# Patient Record
Sex: FEMALE | Race: WHITE | NOT HISPANIC OR LATINO | Employment: FULL TIME | URBAN - METROPOLITAN AREA
[De-identification: names, ages, dates, MRNs, and addresses within clinical notes are randomized per-mention and may not be internally consistent; named-entity substitution may affect disease eponyms.]

---

## 2017-04-20 ENCOUNTER — OFFICE VISIT - HEALTHEAST (OUTPATIENT)
Dept: FAMILY MEDICINE | Facility: CLINIC | Age: 52
End: 2017-04-20

## 2017-04-20 DIAGNOSIS — K52.839 MICROSCOPIC COLITIS: ICD-10-CM

## 2017-04-20 DIAGNOSIS — G47.00 INSOMNIA: ICD-10-CM

## 2017-04-20 DIAGNOSIS — H81.10 BPPV (BENIGN PAROXYSMAL POSITIONAL VERTIGO): ICD-10-CM

## 2017-04-20 DIAGNOSIS — R00.2 PALPITATIONS: ICD-10-CM

## 2017-04-20 DIAGNOSIS — I73.00 RAYNAUD'S DISEASE: ICD-10-CM

## 2017-04-20 DIAGNOSIS — Z85.3 HX: BREAST CANCER: ICD-10-CM

## 2017-04-20 DIAGNOSIS — G93.32 CHRONIC FATIGUE SYNDROME: ICD-10-CM

## 2017-04-20 LAB
ATRIAL RATE - MUSE: 69 BPM
DIASTOLIC BLOOD PRESSURE - MUSE: NORMAL MMHG
INTERPRETATION ECG - MUSE: NORMAL
P AXIS - MUSE: 47 DEGREES
PR INTERVAL - MUSE: 164 MS
QRS DURATION - MUSE: 86 MS
QT - MUSE: 424 MS
QTC - MUSE: 454 MS
R AXIS - MUSE: 20 DEGREES
SYSTOLIC BLOOD PRESSURE - MUSE: NORMAL MMHG
T AXIS - MUSE: 43 DEGREES
VENTRICULAR RATE- MUSE: 69 BPM

## 2017-04-20 RX ORDER — TAMOXIFEN CITRATE 20 MG/1
20 TABLET ORAL 2 TIMES DAILY
Status: SHIPPED | COMMUNITY
Start: 2017-04-20 | End: 2023-03-29

## 2017-04-20 ASSESSMENT — MIFFLIN-ST. JEOR: SCORE: 1254.52

## 2017-04-20 NOTE — ASSESSMENT & PLAN NOTE
Has had it for many years and now sx x 2 weeks.   Referral to pt/ ot.   Dizziness - Vestibular Therapist  Leandro Segura, PT, DPT, NCS  Physical Therapist [facility]   Appointements: [phone]  [address] [facility] [address]

## 2017-04-20 NOTE — ASSESSMENT & PLAN NOTE
Had cbc, tsh and other labs done at rheum 6 months ago and all were normal. Declined further labs today. Will order ekg today and event monitor to see if there is something worrisome noted.

## 2017-04-20 NOTE — ASSESSMENT & PLAN NOTE
No sx now.  Takes mesalamine 800mg 3 tabs bid. Now insurance is no longer covering Mesalamine. She wants to see GI to see what alternatives she has.  Referral to GI placed

## 2017-04-20 NOTE — ASSESSMENT & PLAN NOTE
Daily exercise helps. Does not want to use antidepressants. Saw rheumatologist 6 months ago who did work up that was all negative.

## 2017-05-08 ENCOUNTER — HOSPITAL ENCOUNTER (OUTPATIENT)
Dept: PHYSICAL THERAPY | Age: 52
Setting detail: THERAPIES SERIES
Discharge: STILL ADMITTED OR ADMINISTRATIVE | End: 2017-05-08
Attending: PHYSICAL THERAPIST

## 2017-05-08 DIAGNOSIS — H81.12 BPPV (BENIGN PAROXYSMAL POSITIONAL VERTIGO), LEFT: ICD-10-CM

## 2017-05-08 DIAGNOSIS — R42 DIZZINESS: ICD-10-CM

## 2017-05-10 ENCOUNTER — HOSPITAL ENCOUNTER (OUTPATIENT)
Dept: CARDIOLOGY | Facility: CLINIC | Age: 52
Discharge: HOME | End: 2017-05-10
Attending: FAMILY MEDICINE

## 2017-05-10 DIAGNOSIS — R00.2 PALPITATIONS: ICD-10-CM

## 2017-05-12 ENCOUNTER — HOSPITAL ENCOUNTER (OUTPATIENT)
Dept: PHYSICAL THERAPY | Age: 52
Setting detail: THERAPIES SERIES
Discharge: STILL ADMITTED OR ADMINISTRATIVE | End: 2017-05-12
Attending: PHYSICAL THERAPIST

## 2017-05-12 DIAGNOSIS — R42 DIZZINESS: ICD-10-CM

## 2017-05-12 DIAGNOSIS — H81.12 BPPV (BENIGN PAROXYSMAL POSITIONAL VERTIGO), LEFT: ICD-10-CM

## 2017-05-19 ENCOUNTER — COMMUNICATION - HEALTHEAST (OUTPATIENT)
Dept: FAMILY MEDICINE | Facility: CLINIC | Age: 52
End: 2017-05-19

## 2017-05-22 ENCOUNTER — HOSPITAL ENCOUNTER (OUTPATIENT)
Dept: PHYSICAL THERAPY | Age: 52
Setting detail: THERAPIES SERIES
Discharge: STILL ADMITTED OR ADMINISTRATIVE | End: 2017-05-22
Attending: PHYSICAL THERAPIST

## 2017-05-22 DIAGNOSIS — R42 DIZZINESS: ICD-10-CM

## 2017-05-22 DIAGNOSIS — H81.12 BPPV (BENIGN PAROXYSMAL POSITIONAL VERTIGO), LEFT: ICD-10-CM

## 2017-05-24 ENCOUNTER — COMMUNICATION - HEALTHEAST (OUTPATIENT)
Dept: FAMILY MEDICINE | Facility: CLINIC | Age: 52
End: 2017-05-24

## 2017-05-26 ENCOUNTER — HOSPITAL ENCOUNTER (OUTPATIENT)
Dept: PHYSICAL THERAPY | Age: 52
Setting detail: THERAPIES SERIES
Discharge: STILL ADMITTED OR ADMINISTRATIVE | End: 2017-05-26
Attending: PHYSICAL THERAPIST

## 2017-05-26 DIAGNOSIS — H81.12 BPPV (BENIGN PAROXYSMAL POSITIONAL VERTIGO), LEFT: ICD-10-CM

## 2017-05-26 DIAGNOSIS — R42 DIZZINESS: ICD-10-CM

## 2017-06-01 ENCOUNTER — HOSPITAL ENCOUNTER (OUTPATIENT)
Dept: PHYSICAL THERAPY | Age: 52
Setting detail: THERAPIES SERIES
Discharge: STILL ADMITTED OR ADMINISTRATIVE | End: 2017-06-01
Attending: PHYSICAL THERAPIST

## 2017-06-01 DIAGNOSIS — H81.12 BPPV (BENIGN PAROXYSMAL POSITIONAL VERTIGO), LEFT: ICD-10-CM

## 2017-06-01 DIAGNOSIS — R42 DIZZINESS: ICD-10-CM

## 2017-10-12 ENCOUNTER — OFFICE VISIT - HEALTHEAST (OUTPATIENT)
Dept: FAMILY MEDICINE | Facility: CLINIC | Age: 52
End: 2017-10-12

## 2017-10-12 DIAGNOSIS — R07.0 THROAT PAIN: ICD-10-CM

## 2017-10-12 ASSESSMENT — MIFFLIN-ST. JEOR: SCORE: 1271.31

## 2017-10-12 NOTE — ASSESSMENT & PLAN NOTE
She has anterior neck pain on and off for several years. Started to get gradually worse about 6 months ago and now in the past one month she it is significantly worse. The pain feels like pinching feeling that radiates outward from her anterior neck just below the thyroid.   She feels her voice is weak and hoarse at times.    No problem swallowing.

## 2017-11-08 ENCOUNTER — OFFICE VISIT - HEALTHEAST (OUTPATIENT)
Dept: OTOLARYNGOLOGY | Facility: CLINIC | Age: 52
End: 2017-11-08

## 2017-11-08 DIAGNOSIS — R07.0 THROAT PAIN: ICD-10-CM

## 2017-11-28 ENCOUNTER — HOSPITAL ENCOUNTER (OUTPATIENT)
Dept: CT IMAGING | Facility: CLINIC | Age: 52
Discharge: HOME | End: 2017-11-28
Attending: OTOLARYNGOLOGY

## 2017-11-28 DIAGNOSIS — R07.0 THROAT PAIN: ICD-10-CM

## 2017-12-07 ENCOUNTER — COMMUNICATION - HEALTHEAST (OUTPATIENT)
Dept: OTOLARYNGOLOGY | Facility: CLINIC | Age: 52
End: 2017-12-07

## 2018-03-01 ENCOUNTER — OFFICE VISIT - RIVER FALLS (OUTPATIENT)
Dept: FAMILY MEDICINE | Facility: CLINIC | Age: 53
End: 2018-03-01

## 2018-03-01 ASSESSMENT — MIFFLIN-ST. JEOR: SCORE: 1287.63

## 2018-04-24 ENCOUNTER — OFFICE VISIT - HEALTHEAST (OUTPATIENT)
Dept: FAMILY MEDICINE | Facility: CLINIC | Age: 53
End: 2018-04-24

## 2018-04-24 DIAGNOSIS — H81.10 BPPV (BENIGN PAROXYSMAL POSITIONAL VERTIGO): ICD-10-CM

## 2018-04-24 DIAGNOSIS — G47.00 INSOMNIA: ICD-10-CM

## 2018-04-24 DIAGNOSIS — Z13.29 SCREENING FOR ENDOCRINE, NUTRITIONAL, METABOLIC AND IMMUNITY DISORDER: ICD-10-CM

## 2018-04-24 DIAGNOSIS — Z13.0 SCREENING, ANEMIA, DEFICIENCY, IRON: ICD-10-CM

## 2018-04-24 DIAGNOSIS — Z85.3 HX: BREAST CANCER: ICD-10-CM

## 2018-04-24 DIAGNOSIS — R07.0 THROAT PAIN: ICD-10-CM

## 2018-04-24 DIAGNOSIS — Z12.11 SCREEN FOR COLON CANCER: ICD-10-CM

## 2018-04-24 DIAGNOSIS — Z13.1 SCREENING FOR DIABETES MELLITUS: ICD-10-CM

## 2018-04-24 DIAGNOSIS — K52.839 MICROSCOPIC COLITIS: ICD-10-CM

## 2018-04-24 DIAGNOSIS — Z13.29 SCREENING FOR THYROID DISORDER: ICD-10-CM

## 2018-04-24 DIAGNOSIS — Z13.228 SCREENING FOR ENDOCRINE, NUTRITIONAL, METABOLIC AND IMMUNITY DISORDER: ICD-10-CM

## 2018-04-24 DIAGNOSIS — Z13.21 SCREENING FOR ENDOCRINE, NUTRITIONAL, METABOLIC AND IMMUNITY DISORDER: ICD-10-CM

## 2018-04-24 DIAGNOSIS — G93.32 CHRONIC FATIGUE SYNDROME: ICD-10-CM

## 2018-04-24 DIAGNOSIS — Z13.220 SCREENING, LIPID: ICD-10-CM

## 2018-04-24 DIAGNOSIS — Z13.228 SCREENING FOR METABOLIC DISORDER: ICD-10-CM

## 2018-04-24 DIAGNOSIS — R00.2 PALPITATIONS: ICD-10-CM

## 2018-04-24 DIAGNOSIS — I73.00 RAYNAUD'S DISEASE: ICD-10-CM

## 2018-04-24 DIAGNOSIS — Z13.0 SCREENING FOR ENDOCRINE, NUTRITIONAL, METABOLIC AND IMMUNITY DISORDER: ICD-10-CM

## 2018-04-24 LAB
BASOPHILS # BLD AUTO: 0.1 THOU/UL (ref 0–0.2)
BASOPHILS NFR BLD AUTO: 1 % (ref 0–2)
EOSINOPHIL # BLD AUTO: 0.4 THOU/UL (ref 0–0.4)
EOSINOPHIL NFR BLD AUTO: 5 % (ref 0–6)
ERYTHROCYTE [DISTWIDTH] IN BLOOD BY AUTOMATED COUNT: 12.3 % (ref 11–14.5)
HCT VFR BLD AUTO: 38.1 % (ref 35–47)
HGB BLD-MCNC: 12.8 G/DL (ref 12–16)
LYMPHOCYTES # BLD AUTO: 3 THOU/UL (ref 0.8–4.4)
LYMPHOCYTES NFR BLD AUTO: 44 % (ref 20–40)
MCH RBC QN AUTO: 29.2 PG (ref 27–34)
MCHC RBC AUTO-ENTMCNC: 33.6 G/DL (ref 32–36)
MCV RBC AUTO: 87 FL (ref 80–100)
MONOCYTES # BLD AUTO: 0.6 THOU/UL (ref 0–0.9)
MONOCYTES NFR BLD AUTO: 9 % (ref 2–10)
NEUTROPHILS # BLD AUTO: 2.7 THOU/UL (ref 2–7.7)
NEUTROPHILS NFR BLD AUTO: 41 % (ref 50–70)
PLATELET # BLD AUTO: 235 THOU/UL (ref 140–440)
PMV BLD AUTO: 6.8 FL (ref 7–10)
RBC # BLD AUTO: 4.37 MILL/UL (ref 3.8–5.4)
WBC: 6.8 THOU/UL (ref 4–11)

## 2018-04-24 ASSESSMENT — MIFFLIN-ST. JEOR: SCORE: 1281.74

## 2018-04-24 NOTE — ASSESSMENT & PLAN NOTE
Gets sx every day. She did see the vestibular therapist.  She has trained her body so that it is tolerable. I has improved with pt/ ot. She does not want to do more pt/ ot because it is fine now.

## 2018-04-24 NOTE — ASSESSMENT & PLAN NOTE
She does get chronic pain and uses tylenol and is interested in the pain clinic for possible alternative pain therapies. Referral placed - intake packet given.

## 2018-04-24 NOTE — ASSESSMENT & PLAN NOTE
Had CAROLE hook up monitor and had work up all was normal. Still gets palpitations but declined cardiology consult since nothing seen on monitor.

## 2018-04-24 NOTE — ASSESSMENT & PLAN NOTE
Sees oncologist once annually - did this a few months ago and she got a clean bill of health. She has been on tamoxifen for 3 years and plans to be on it for 7 sevenmore years.  Dr. Ramires is her oncologist.

## 2018-04-24 NOTE — ASSESSMENT & PLAN NOTE
ent work up including nasogastric scope all normal and ct throat normal. Jasmin says this might be part of her chronic fatigue.

## 2018-04-25 LAB
25(OH)D3 SERPL-MCNC: 39.1 NG/ML (ref 30–80)
25(OH)D3 SERPL-MCNC: 39.1 NG/ML (ref 30–80)
ALBUMIN SERPL-MCNC: 3.8 G/DL (ref 3.5–5)
ALP SERPL-CCNC: 50 U/L (ref 45–120)
ALT SERPL W P-5'-P-CCNC: 16 U/L (ref 0–45)
ANION GAP SERPL CALCULATED.3IONS-SCNC: 10 MMOL/L (ref 5–18)
AST SERPL W P-5'-P-CCNC: 22 U/L (ref 0–40)
BILIRUB SERPL-MCNC: 0.5 MG/DL (ref 0–1)
BUN SERPL-MCNC: 10 MG/DL (ref 8–22)
CALCIUM SERPL-MCNC: 9 MG/DL (ref 8.5–10.5)
CHLORIDE BLD-SCNC: 106 MMOL/L (ref 98–107)
CHOLEST SERPL-MCNC: 201 MG/DL
CO2 SERPL-SCNC: 26 MMOL/L (ref 22–31)
CREAT SERPL-MCNC: 0.67 MG/DL (ref 0.6–1.1)
FASTING STATUS PATIENT QL REPORTED: NO
GFR SERPL CREATININE-BSD FRML MDRD: >60 ML/MIN/1.73M2
GLUCOSE BLD-MCNC: 77 MG/DL (ref 70–125)
HDLC SERPL-MCNC: 81 MG/DL
LDLC SERPL CALC-MCNC: 103 MG/DL
POTASSIUM BLD-SCNC: 4.2 MMOL/L (ref 3.5–5)
PROT SERPL-MCNC: 6.8 G/DL (ref 6–8)
SODIUM SERPL-SCNC: 142 MMOL/L (ref 136–145)
TRIGL SERPL-MCNC: 87 MG/DL
TSH SERPL DL<=0.005 MIU/L-ACNC: 1.16 UIU/ML (ref 0.3–5)

## 2018-11-14 ENCOUNTER — RECORDS - HEALTHEAST (OUTPATIENT)
Dept: ADMINISTRATIVE | Facility: OTHER | Age: 53
End: 2018-11-14

## 2018-11-15 ENCOUNTER — RECORDS - HEALTHEAST (OUTPATIENT)
Dept: ADMINISTRATIVE | Facility: OTHER | Age: 53
End: 2018-11-15

## 2019-03-18 ENCOUNTER — OFFICE VISIT - RIVER FALLS (OUTPATIENT)
Dept: FAMILY MEDICINE | Facility: CLINIC | Age: 54
End: 2019-03-18

## 2019-03-18 ASSESSMENT — MIFFLIN-ST. JEOR: SCORE: 1267.67

## 2019-09-19 ENCOUNTER — OFFICE VISIT - RIVER FALLS (OUTPATIENT)
Dept: FAMILY MEDICINE | Facility: CLINIC | Age: 54
End: 2019-09-19

## 2019-10-09 ENCOUNTER — RECORDS - HEALTHEAST (OUTPATIENT)
Dept: ADMINISTRATIVE | Facility: OTHER | Age: 54
End: 2019-10-09

## 2019-10-17 ENCOUNTER — HOSPITAL ENCOUNTER (OUTPATIENT)
Dept: MRI IMAGING | Facility: HOSPITAL | Age: 54
Discharge: HOME | End: 2019-10-17
Attending: PHYSICIAN ASSISTANT

## 2019-10-17 DIAGNOSIS — Z98.890 H/O BREAST RECONSTRUCTION: ICD-10-CM

## 2019-10-17 DIAGNOSIS — Z98.82 BREAST IMPLANT STATUS: ICD-10-CM

## 2019-12-30 ENCOUNTER — OFFICE VISIT - RIVER FALLS (OUTPATIENT)
Dept: FAMILY MEDICINE | Facility: CLINIC | Age: 54
End: 2019-12-30

## 2020-03-04 ENCOUNTER — OFFICE VISIT - RIVER FALLS (OUTPATIENT)
Dept: FAMILY MEDICINE | Facility: CLINIC | Age: 55
End: 2020-03-04

## 2020-03-04 ASSESSMENT — MIFFLIN-ST. JEOR: SCORE: 1284

## 2020-06-17 ENCOUNTER — OFFICE VISIT - HEALTHEAST (OUTPATIENT)
Dept: FAMILY MEDICINE | Facility: CLINIC | Age: 55
End: 2020-06-17

## 2020-06-17 ENCOUNTER — COMMUNICATION - HEALTHEAST (OUTPATIENT)
Dept: FAMILY MEDICINE | Facility: CLINIC | Age: 55
End: 2020-06-17

## 2020-06-17 DIAGNOSIS — Z20.822 COVID-19 RULED OUT: ICD-10-CM

## 2020-06-24 ENCOUNTER — OFFICE VISIT - RIVER FALLS (OUTPATIENT)
Dept: FAMILY MEDICINE | Facility: CLINIC | Age: 55
End: 2020-06-24

## 2020-06-27 ENCOUNTER — OFFICE VISIT - RIVER FALLS (OUTPATIENT)
Dept: FAMILY MEDICINE | Facility: CLINIC | Age: 55
End: 2020-06-27

## 2020-07-09 ENCOUNTER — OFFICE VISIT - RIVER FALLS (OUTPATIENT)
Dept: FAMILY MEDICINE | Facility: CLINIC | Age: 55
End: 2020-07-09

## 2020-09-12 ENCOUNTER — OFFICE VISIT - RIVER FALLS (OUTPATIENT)
Dept: FAMILY MEDICINE | Facility: CLINIC | Age: 55
End: 2020-09-12

## 2020-09-12 ASSESSMENT — MIFFLIN-ST. JEOR: SCORE: 1281.28

## 2020-10-03 ENCOUNTER — OFFICE VISIT - HEALTHEAST (OUTPATIENT)
Dept: FAMILY MEDICINE | Facility: CLINIC | Age: 55
End: 2020-10-03

## 2020-10-03 DIAGNOSIS — R35.0 URINE FREQUENCY: ICD-10-CM

## 2021-01-25 ENCOUNTER — OFFICE VISIT - RIVER FALLS (OUTPATIENT)
Dept: FAMILY MEDICINE | Facility: CLINIC | Age: 56
End: 2021-01-25

## 2021-04-26 ENCOUNTER — COMMUNICATION - HEALTHEAST (OUTPATIENT)
Dept: CARDIOLOGY | Facility: CLINIC | Age: 56
End: 2021-04-26

## 2021-05-30 VITALS — WEIGHT: 143 LBS | HEIGHT: 65 IN | BODY MASS INDEX: 23.82 KG/M2

## 2021-05-31 VITALS — HEIGHT: 65 IN | WEIGHT: 146.7 LBS | BODY MASS INDEX: 24.44 KG/M2

## 2021-06-01 VITALS — HEIGHT: 65 IN | BODY MASS INDEX: 24.83 KG/M2 | WEIGHT: 149 LBS

## 2021-06-08 NOTE — TELEPHONE ENCOUNTER
Called patient to clarify reason for the COVID testing today.  Patient was scheduled as an [facility] referral.  Patient states the she was referred for testing by the [facility].  Appointment type updated to reflect the appropriate visit.  Patient verbalized understanding and is agreeable with the plan of care.

## 2021-06-10 NOTE — PROGRESS NOTES
Physical Therapy  Physical Therapy Daily Outpatient Documentation        Rx Units: 1 -NR, 1 - CRP     Total OP Minutes: 30  Treatment #: 4/7    Pain: 0/10  Location: dizziness   Intervention: see below    Subjective: Patient notes the past two nights she has been experiencing brief spinning with lying down.       Neuro Re-Ed/Balance  Total Minutes: 15    Patient educated on positional testing technique and purpose of recreating symptoms - patient consented. Vestibular semi-circular canal assessment with infrared goggle, fixation removed:   Left [place] Hallpike:  Positive for fatiguing left torsional upbeating nystagmus, reports of dizziness.    Right [place] Hallpike: negative  Left Supine Roll Test (HC): negative  Right Supine Roll Test (HC): negative  Patient educated on very mild L PC BPPV findings.    Pt educated on post maneuver modifications and self CRP with demonstration and all questions addressed.       Canalith Repositioning Procedure  Total Minutes: 15  Treatment for L PC BPPV. Education on maneuver and purpose of maneuver. Patient consented to treatment. Canal repositioning maneuver performed to  L PC for canalithiasis BPPV x 3 cycles with 30 second holds in each position after symptoms subsided. Two minute rest break after cycle provided.        Assessment/Plan for week of 5/22/2017-5/26/2017:  Patient demonstrated nystagmus with left [place] Hallpike testing indicated L PC BPPV.  She tolerated 3 cycles of treatment and was provided self CRM handout to try at home between now and next session.  Plan to reassess canals next session and treat as indicated.     Additional Notes:  PT goals:  Pt will...  1. Report <2/10 dizziness with bed mobility in order to ease sleep.  2. Score 30 seconds on all conditions of the mCTSIB  3. Be independent with a HEP to self manage symptoms.

## 2021-06-10 NOTE — PROGRESS NOTES
Physical Therapy  Therapy Initial Plan of Care      Medical Diagnosis: Dizziness, BPPV         Treatment Dx: BPPV, dizziness  Referring MD: Opal Delatorre MD  Onset date 4/20/2017     Start of Care 5/8/2017       Assessment:  Patient is a 50 yo female who presents with dizziness symptoms that have been occurring for about a month and affecting her sleep and ability to play with her 6 yo son. Her dizziness is described as episodic spinning sensations which occurs with positional changes. Physical therapy evaluation revealed abnormal balance on the mCTSIB with eyes closed on foam and positive nystagmus during left [place] Hallpike testing. Examination findings are consistently with L PC canalithiasis BPPV and pt would benefit from skilled 1:1 PT in order to address deficits and optimize function.     Prognostic Indicators:  Rehabilitation potential: Good and based on age, nature of injury and motivation    Impairment:  Balance and Dizziness    Functional Goals:  to be met by 7 visits  Pt will...  1. Report <2/10 dizziness with bed mobility in order to ease sleep.  2. Score 30 seconds on all conditions of the mCTSIB  3. Be independent with a HEP to self manage symptoms.       Plan of Care:  Communication with referral source, patient, caregiver., Paitent/Family Instruction: Treatment plan/rationale, home exercise program, expected functional outcome., Therapeutic Exercise, Neuromuscular reeducation, Therapeutic Activity and Canal Respositioning    Frequency / Duration: 1-2 x/week for up to 7 visits    Leandro Segura, PT, DPT, NCS   5/8/2017   5:00 PM      Physician Recommendation:  1. I certify the need for these services furnished within this plan and while under my care. I agree with the therapist's recommendation for plan of care.    2. If there is any recommendation for modification of therapy plan, please indicate below.      Physician's Signature (Printed):  _____________________________

## 2021-06-10 NOTE — PROGRESS NOTES
Physical Therapy  Physical Therapy Daily Outpatient Documentation        Rx Units: 1 -NR, 1 - CRP     Total OP Minutes: 25   Treatment #: 3/7    Pain: 0/10  Location: dizziness   Intervention: see below    Subjective: Patient notes no dizziness or spinning when she lies down or rolls in bed.  She has occasionally been having a swaying sensation when she performs sit to stand.  Overall she has been able to return to normal activities and work duties.       Neuro Re-Ed/Balance  Total Minutes: 15    Patient educated on positional testing technique and purpose of recreating symptoms - patient consented. Vestibular semi-circular canal assessment with infrared goggle, fixation removed:   Left [place] Hallpike: very mild left torsional up beating nystagmus which fatigued after 10 seconds, asymptomatic   Right [place] Hallpike: negative  Left Supine Roll Test (HC): negative  Right Supine Roll Test (HC): negative  Patient educated on very mild L PC BPPV findings.    mCTSIB   Firm ground, eyes open: >30 seconds   Firm ground, eyes closed: >30 seconds   Foam, eyes open: >30 seconds   Foam, eyes closed: >30 seconds      Canalith Repositioning Procedure  Total Minutes: 10  Treatment for L PC BPPV. Education on maneuver and purpose of maneuver. Patient consented to treatment. Canal repositioning maneuver performed to  L PC for canalithiasis BPPV x 1 cycles with 30 second holds in each position after symptoms subsided. Two minute rest break after cycle provided.        Assessment/Plan for week of 5/22/2017-5/26/2017:  Patient presents with no reports of dizziness with bed mobility this date.  Vestibular positional testing was positive for very mild L PC BPPV involvement and she tolerated 1 cycle of CRP.  She scored normal balance on all conditions of the mCTSIB which is an improvement from initial evaluation where she was only able to stand for 6 seconds on the foam with eyes closed.  Overall she has returned to her normal activity and  movements.  Due to reoccurring nature of BPPV, her follow up appointment with be left open.  If patient does not return in a months time, this will be considered her discharge summary.     Additional Notes:  PT goals:  Pt will...  1. Report <2/10 dizziness with bed mobility in order to ease sleep.  2. Score 30 seconds on all conditions of the mCTSIB  3. Be independent with a HEP to self manage symptoms.

## 2021-06-10 NOTE — PROGRESS NOTES
Physical Therapy  PHYSICAL THERAPY INITIAL VESTIBULAR EVALUATION                          Date of Onset: April 2017  Subjective: Patient reports that she has had a history of dizziness over the course of many years however her last bout of dizziness with BPPV was over 7 years ago.  About a month ago, she began noticing dizziness with positional changes.  She states that nights tend to be the most challenging. She will experience dizziness upon lying down on her back or left side. She has been able to lie on her right side without symptoms, however if she rolling onto her left side, she experiences spinning sensations as well as nausea.  These symptoms last for up to one minute.  She also notes experiencing spinning sensations with looking up or down such as washing her hair, looking up to catch a ball or bending over to put her shoes on.  She has been performing [place] exercises however has not been able to lie down completed on her left side during the exercise due to significant dizziness symptoms.  Patient also notes unsteadiness symptoms after driving her car.   Overall her symptoms have been affecting her ability to play with her 6 yo son and sleep.  She has developed right shoulder discomfort due to only lying on her right side at night.     Patient's goal: decrease dizziness and ease sleep    Pain rating: NA  Location: NA   Shoulder Clear? Yes  Other information/data: PMH: breast CA with bilateral mastectomy and tamoxifen 2015.    AROM: Cervical  - WNL        Postural Assessment: unremarkable    Vestibular/Balance  Gait:Normal    Vertebral Basilar Artery: Normal   Ocular AROM: Normal  Smooth Pursuit: Normal  Saccades: Normal; dizziness with left target    VOR Cancellation: Normal  Slow VOR: Normal  Right Head Impulse Test: Negative  Left Head Impulse Test: Positive    Ocular Assessment with Infrared goggles:   Spontaneous nystagmus: negative   Gaze evoked nystagmus: negative   Head Shake nystagmus:  negative   Pressure nystagmus: negative B    Patient educated on positional testing technique and purpose of recreating symptoms - patient consented. Vestibular semi-circular canal assessment with infrared goggle, fixation removed:   Left [place] Hallpike: left torsional upbeating nystagmus which fatigued after 25 seconds, positive symptoms  Right [place] Hallpike: mild upbeating nystagmus, however no torsion noted  Left Supine Roll Test (HC): negative  Right Supine Roll Test (HC): negative  Patient educated on L PC BPPV findings.      Sensory Organization Tests:  MCTSIB: NSEO Normal       PSEO Normal       NSEC Normal                  PSEC Abnormal- 6 seconds before posterior LOB.          Initial Treatment: CRP x 15 minutes:  Treatment for L PC BPPV. Education on maneuver and purpose of maneuver. Patient consented to treatment. Canal repositioning maneuver performed to L PC for canalithiasis BPPV x 2 cycles with 30 second holds in each position after symptoms subsided. Two minute rest break between cycles was provided.  Patient was educated on post maneuver modifications for 48-72 hours.  Patient educated on vestibular/inner ear anatomy and physiology. Education provided regarding BPPV dysfunction and relation to dizziness symptoms.  Educational handouts on BPPV provided and all questions addressed.       Therapist Recommendations:  Impairments identified; See POC for goals and scheduled for subsequent visits      Rx Units Evaluation, CRP  Total Minutes: 55

## 2021-06-10 NOTE — PROGRESS NOTES
ASSESSMENT AND PLAN: We spent 45 minutes today in direct patient contact, 100% of the time in consultation concerning medical problems as listed below.   Problem List Items Addressed This Visit     HX: breast cancer     Bilateral mastectomy and tamoxifen 2015.  Now in remission. Oncologist - Dr. Charles RICHARDSON oncology         Microscopic colitis     No sx now.  Takes mesalamine 800mg 3 tabs bid. Now insurance is no longer covering Mesalamine. She wants to see GI to see what alternatives she has.  Referral to GI placed          Relevant Orders    Ambulatory referral to Gastroenterology    Chronic fatigue syndrome     Daily exercise helps. Does not want to use antidepressants. Saw rheumatologist 6 months ago who did work up that was all negative.          Insomnia     Melatonin works.         Raynaud's disease     If hands in cold water, or if she picks up something cold.          BPPV (benign paroxysmal positional vertigo)     Has had it for many years and now sx x 2 weeks.   Referral to pt/ ot.   Dizziness - Vestibular Therapist  Leandro Segura, PT, DPT, NCS  Physical Therapist [facility]   Appointements: [phone] [address] [facility] [address]         Relevant Orders    Ambulatory referral to PT/OT    Palpitations     Had cbc, tsh and other labs done at [facility] 6 months ago and all were normal. Declined further labs today. Will order ekg today and event monitor to see if there is something worrisome noted.          Relevant Orders    CAROLE Hook-Up    Electrocardiogram Perform - Clinic (Completed)        Chief Complaint   Patient presents with     Establish Care     Having issues with vertigo.      HPI  Jasmin ALBERT Enrique Osborne is a 51 y.o. female comes in today to establish care. We are meeting for the first time and updated her problem list and medical history.     She also mentions palpitations that occur a few times a week lasting up to 5 min at a time and causing some mild lightheadedness, but  "never resulting in loss of consciousness. She is able to exercise over 4 mets without problems so there is no exertional component and exertion does not bring sx on.     She also has had BPPV for \"as long as I can remember\" but she has done physical therapy in the past which has helped. She wonders if she can do more physical therapy now since sx are present for the past two weeks.     History   Smoking Status     Never Smoker   Smokeless Tobacco     Never Used      Current Outpatient Prescriptions   Medication Sig Dispense Refill     mesalamine (ASACOL HD) 800 mg TbEC EC tablet Take 1,600 mg by mouth 3 (three) times a day.       tamoxifen (NOLVADEX) 20 MG tablet Take 20 mg by mouth 2 (two) times a day.       No current facility-administered medications for this visit.      Allergies   Allergen Reactions     Macrobid [Nitrofurantoin Monohyd/M-Cryst]      Sulfa (Sulfonamide Antibiotics)      Past Surgical History:   Procedure Laterality Date      SECTION       mastectomy with reconstruction       tonsils removed        Family History   Problem Relation Age of Onset     Cancer Mother 74     pancreatic     Breast cancer Sister 56      Review of Systems   Constitutional: Negative.    HENT: Negative.    Eyes: Negative.    Respiratory: Negative.    Cardiovascular: Positive for palpitations.   Gastrointestinal: Negative.    Endocrine: Negative.    Genitourinary: Negative.    Musculoskeletal: Negative.    Skin: Negative.    Neurological: Positive for dizziness.   Hematological: Negative.    Psychiatric/Behavioral: Negative.      OBJECTIVE: /60  Pulse 72  Resp 14  Ht 5' 5\" (1.651 m)  Wt 143 lb (64.9 kg)  LMP 2017 (Exact Date)  Breastfeeding? No  BMI 23.8 kg/m2  Physical Exam   Constitutional: She is oriented to person, place, and time. She appears well-developed and well-nourished.   HENT:   Head: Normocephalic and atraumatic.   Eyes: Conjunctivae are normal.   Cardiovascular: Normal rate, " regular rhythm and normal heart sounds.    Pulmonary/Chest: Effort normal and breath sounds normal.   Neurological: She is alert and oriented to person, place, and time.   Skin: Skin is warm and dry.   Psychiatric: She has a normal mood and affect.

## 2021-06-10 NOTE — PROGRESS NOTES
Physical Therapy  Physical Therapy Daily Outpatient Documentation        Rx Units: 1 -NR, 1 - CRP    Total OP Minutes: 40    Treatment #: 2/7    Pain: 1/10  Location: dizziness   Intervention: see below    Subjective: Patient reported lying in recliner for 2 hours after last session in order to manage her symptoms. The following day she noted significantly improved dizziness and returned to sleeping in bed. Upon waking on Wednesday she experienced some spinning and swaying sensations, however not as intense.   She was able to tolerance playing catch with her son and notes that when she tilts her head back, she gets symptoms.       Neuro Re-Ed/Balance  Total Minutes: 20  Patient educated on positional testing technique and purpose of recreating symptoms - patient consented. Vestibular semi-circular canal assessment with infrared goggle, fixation removed:   Left [place] Hallpike: fatiguing, left torsional upbeating nystagmus  Right [place] Hallpike: up beating nystagmus, mild reports of dizziness  Left Supine Roll Test (HC): left beating nystagmus which fatigued after 10 seconds  Right Supine Roll Test (HC): right beating nystagmus which fatigued after 5 seconds  Patient educated on L PC BPPV and mild L HC BPPV findings.    Patient educated on vestibular/inner ear anatomy and physiology. Education provided regarding BPPV dysfunction and relation to dizziness symptoms.  It was recommended that she follow post CRP modifications for 48-72 hours then return to normal activities.  All questions addressed.       Canalith Repositioning Procedure  Total Minutes: 20  Treatment for L PC BPPV. Education on maneuver and purpose of maneuver. Patient consented to treatment. Canal repositioning maneuver performed to L PC for canalithiasis BPPV x 3 cycles with 30 second holds in each position after symptoms subsided. Two minute rest break between cycles was provided.  No reports of nausea this date.    Treatment for L HC BPPV. Education on  maneuver and purpose of maneuver. Patient consented to treatment. Canal repositioning maneuver performed to L HC for canalithiasis BPPV x 1 cycles with 30 second holds in each position after symptoms subsided. No reports of dizziness.         Assessment/Plan for week of 5/8/2017-5/12/2017:  Patient presented with less reports of dizziness after initial evaluation and treatment. She continues to be positive for L PC BPPV and was treated with 3 cycles of CRP.  She demonstrated improved tolerance this date with no reports of nausea.  Additional HC CRP was performed for L HC BPPV due to positive testing with supine roll test.  Plan to reassess canals next session and treat as indicated.     Additional Notes:  PT goals:  Pt will...  1. Report <2/10 dizziness with bed mobility in order to ease sleep.  2. Score 30 seconds on all conditions of the mCTSIB  3. Be independent with a HEP to self manage symptoms.

## 2021-06-11 NOTE — PROGRESS NOTES
Physical Therapy  Physical Therapy Daily Outpatient Documentation        Rx Units: 1 -NR, 1 - CRP     Total OP Minutes: 25  Treatment #: 5/7    Pain: 0/10  Location: dizziness   Intervention: see below    Subjective: Patient developed dizziness for about 5 minutes on Saturday after climbing spiral stairs at a tower in [place] and attempting to take a picture.  Overall she reports that her symptoms have improved and she only experiences brief spinning when she rolling onto her left side or looks up. Patient has not performed the self CRP yet.        Neuro Re-Ed/Balance  Total Minutes: 15    Patient educated on positional testing technique and purpose of recreating symptoms - patient consented. Vestibular semi-circular canal assessment with infrared goggle, fixation removed:   Left [place] Hallpike:  Positive for fatiguing left torsional upbeating nystagmus, reports of dizziness.    Right [place] Hallpike: mild upbeating nystagmus observed; initial reports of dizziness that lasted <5 seconds  Left Supine Roll Test (HC): left torsional nystagmus, no symptoms  Right Supine Roll Test (HC): mild right beating nystagmus,   Patient educated on L PC BPPV findings.    Pt educated on post maneuver modifications and self CRP with demonstration and all questions addressed.       Canalith Repositioning Procedure  Total Minutes: 15  Treatment for L PC BPPV. Education on maneuver and purpose of maneuver. Patient consented to treatment. Canal repositioning maneuver performed to  L PC for canalithiasis BPPV x 3 cycles with 30 second holds in each position after symptoms subsided. Two minute rest break after cycle provided.  Decreased duration of nystagmus observed and decreased reports of dizziness.         Assessment/Plan for week of 5/22/2017-5/26/2017:  Patient demonstrated nystagmus with left [place] Hallpike testing indicated L PC BPPV.  She tolerated 3 cycles of treatment and verbally reviewed self CRM for HEP.  She did have questionable  involvement of the HC however no reports of symptoms with head turns.  Plan to leave follow up open at this time.      Additional Notes:  PT goals:  Pt will...  1. Report <2/10 dizziness with bed mobility in order to ease sleep.  2. Score 30 seconds on all conditions of the mCTSIB - MET  3. Be independent with a HEP to self manage symptoms. - MET

## 2021-06-12 NOTE — PROGRESS NOTES
I have not seen this patient since 2018. Recommend office visit. Also her evisit was sent 10/3 and I did not receive it until 10/5 due to the fact that she sent it on the weekend, so her symptoms may be worse now.     Please schedule office visit.       Assessment:   There were no encounter diagnoses.     Plan:   No medications were ordered this encounter    Patient Instructions     Based on the information provided, I would recommend you come in for an appointment to discuss these symptoms. Please schedule an appointment.    You will not be charged for this eVisit.    Return for further follow up if needed. Call [phone]) or schedule an appointment via [employer]    Subjective:   Jasmin Osborne is a 54 y.o. female who submitted an eVisit request for evaluation of her Dysuria.  See the questionnaire and message section of encounter report for information related to history of present illness and review of systems.    The following portions of the patient's history were reviewed and updated as appropriate:  She has HX: breast cancer on their pertinent problem list.  She is allergic to macrobid [nitrofurantoin monohyd/m-cryst] and sulfa (sulfonamide antibiotics)..     Objective:   No exam performed today, patient submitted as eVisit.

## 2021-06-13 NOTE — PROGRESS NOTES
ASSESSMENT AND PLAN:  Problem List Items Addressed This Visit     Throat pain     She has anterior neck pain on and off for several years. Started to get gradually worse about 6 months ago and now in the past one month she it is significantly worse. The pain feels like pinching feeling that radiates outward from her anterior neck just below the thyroid.   She feels her voice is weak and hoarse at times.    No problem swallowing.          Relevant Orders    Ambulatory referral to ENT           Chief Complaint   Patient presents with     neck and throat pain     pain and soreness in neck area, getting worse and not getting better.      HPI  Jasmin Osborne is a 51 y.o. female comes in complaining of pain in her anterior neck area ongoing intermittently and mildly for several years but now seems to be exacerbated and at times the pinching pain even seems to radiate outward from her anterior neck.  She points at her supraclavicular notch.    She says she has no other symptoms.  She does not have any runny nose no stuffy nose no sore throat no difficulty with swallowing.  The pain is actually in the anterior neck and with palpation she is unable to really find where it is hurting.  It seems to hurt subjectively.    History   Smoking Status     Never Smoker   Smokeless Tobacco     Never Used      Current Outpatient Prescriptions   Medication Sig Dispense Refill     tamoxifen (NOLVADEX) 20 MG tablet Take 20 mg by mouth 2 (two) times a day.       No current facility-administered medications for this visit.      Allergies   Allergen Reactions     Macrobid [Nitrofurantoin Monohyd/M-Cryst]      Sulfa (Sulfonamide Antibiotics)      Review of Systems   Constitutional: Positive for fatigue (she says she has hx of chronic fatigu type sx which always seem to come on with pain in anterior neck/ throat area).   HENT: Negative.    Eyes: Negative.    Respiratory: Negative.    Cardiovascular: Negative.    Gastrointestinal:  "Negative.    Endocrine: Negative.    Genitourinary: Negative.    Musculoskeletal: Negative.    Skin: Negative.    Neurological: Negative.    Hematological: Negative.    Psychiatric/Behavioral: Negative.       OBJECTIVE: /64 (Patient Site: Left Arm, Patient Position: Sitting, Cuff Size: Adult Regular)  Pulse 72  Ht 5' 5\" (1.651 m)  Wt 146 lb 11.2 oz (66.5 kg)  Breastfeeding? No  BMI 24.41 kg/m2  Physical Exam   Constitutional: She is oriented to person, place, and time. She appears well-developed and well-nourished. No distress.   HENT:   Head: Normocephalic and atraumatic.   Eyes: Conjunctivae are normal.   Neck: Normal range of motion. Neck supple. No JVD present. No tracheal deviation present. No thyromegaly present.   Cardiovascular: Normal rate, regular rhythm and normal heart sounds.    Pulmonary/Chest: Effort normal and breath sounds normal. No stridor.   Abdominal: Soft. Bowel sounds are normal.   Musculoskeletal: Normal range of motion.   Lymphadenopathy:     She has no cervical adenopathy.   Neurological: She is alert and oriented to person, place, and time.   Skin: Skin is warm and dry.   Psychiatric: She has a normal mood and affect.      "

## 2021-06-14 NOTE — PROGRESS NOTES
Jasmin Osborne is a 51 y.o. female seen in consultation at the request of Dr. Lubin for throat pain for 2 years.  Worse over the last 2 months.  Supraclavicular, can radiate up the neck.  Voice can feel fatigues.  Starts in the morning and gets worse over the day.  No episodes of aphonia.  No dysphagia.  No shortness of breath.  If not talking much, seems to make it better, no provocative measures.  History of breast cancer treated with double mastectomy, no chemoradiation.  No reflux symptoms.    ALLERGY:    Allergies   Allergen Reactions     Macrobid [Nitrofurantoin Monohyd/M-Cryst]      Sulfa (Sulfonamide Antibiotics)        MEDICATIONS:     Current Outpatient Prescriptions on File Prior to Visit   Medication Sig Dispense Refill     tamoxifen (NOLVADEX) 20 MG tablet Take 20 mg by mouth 2 (two) times a day.       No current facility-administered medications on file prior to visit.        Past Medical/Surgical History, Family History and Social History reviewed in detail and documented separately in the medical record.    Complete Review of Systems:  A 10-point review was performed.  Pertinent positives are noted in the HPI and on a separate scanned document in the chart.    EXAM:  There were no vitals filed for this visit.    Nurse documentation reviewed  and documented separately.    General Appearance: Pleasant, alert, appropriate appearance for age. No acute distress    Head Exam: Normal. Normocephalic, atraumatic.    Eye Exam: Normal external eye, conjunctiva, lids, cornea. Extra-ocular movements are intact.    Left external ear: normal  Left otoscopic exam: Normal EAC. Normal TM     Right external ear: normal  Right otoscopic exam: Normal EAC. Normal TM    Nose Exam: Normal external nose. Septum midline. Nasal mucosa normal.  Inferior turbinates normal.    OroPharynx Exam: Dental hygiene adequate. Normal tongue. Normal buccal mucosa. Normal palate.  Normal pharynx. Tonsils surgically  absent.    PROCEDURE  Flexible nasopharyngoscopy  The nose is prepped with 4% lidocaine with 1% phenylephrine.  The flexible scope is introduced into the right nare.  Normal nasal anatomy, septum midline with no masses or polyps.  Normal naso/екатерина/hypopharynx.  Vocal cords are normal and mobile.  There is interarytenoid edema consistent with LPR.  The aryepiglottic folds, epiglottis, vallecula and tongue base are normal.       Neck Exam: Supple, no masses or nodes. Trachea and larynx midline.    Thyroid Exam: No tenderness, nodules or enlargement.    Salivary Glands: nontender without masses    Neuro: Alert and oriented times 3, CN 2-12 grossly intact, no nystagmus, PERRL, EOMI, normal speech and gait    Chest/Respiratory Exam: Normal chest wall motion and respiratory effort. No audible stridor or wheezing.    Cardiovascular Exam: Regular rate and rhythm.  No cyanosis, clubbing or edema.    Pulses: carotid pulses normal    ASSESSMENT:  1. Throat pain        PLAN: Findings, assessment, and management options were discussed. Nothing identifiable on examination today.  Given history of breast cancer, reasonable to consider CT neck.  Will call with results.  Could also consider daily ranitidine for 30 days for LPR.

## 2021-06-15 PROBLEM — G47.00 INSOMNIA: Status: ACTIVE | Noted: 2017-04-20

## 2021-06-15 PROBLEM — K52.839 MICROSCOPIC COLITIS: Status: ACTIVE | Noted: 2017-04-20

## 2021-06-15 PROBLEM — H81.10 BPPV (BENIGN PAROXYSMAL POSITIONAL VERTIGO): Status: ACTIVE | Noted: 2017-04-20

## 2021-06-15 PROBLEM — Z85.3 HX: BREAST CANCER: Status: ACTIVE | Noted: 2017-04-20

## 2021-06-15 PROBLEM — R00.2 PALPITATIONS: Status: ACTIVE | Noted: 2017-04-20

## 2021-06-15 PROBLEM — G93.32 CHRONIC FATIGUE SYNDROME: Status: ACTIVE | Noted: 2017-04-20

## 2021-06-15 PROBLEM — I73.00 RAYNAUD'S DISEASE: Status: ACTIVE | Noted: 2017-04-20

## 2021-06-16 PROBLEM — R07.0 THROAT PAIN: Status: ACTIVE | Noted: 2017-10-12

## 2021-06-17 NOTE — PROGRESS NOTES
Assessment:      Healthy female exam.      Plan:      Problem List Items Addressed This Visit        High    HX: breast cancer     Sees oncologist once annually - did this a few months ago and she got a clean bill of health. She has been on tamoxifen for 3 years and plans to be on it for 7 seven more years.  Dr. Ramires is her oncologist.             Unprioritized    Microscopic colitis     She is not taking any meds now and she is really pretty asymptomatic.          Chronic fatigue syndrome     She does get chronic pain and uses tylenol and is interested in the pain clinic for possible alternative pain therapies. Referral placed - intake packet given.         Relevant Orders    Ambulatory referral to Pain Clinic    Insomnia     Melatonin 10 intermittently and meditation for sleep works well.          Raynaud's disease     Heated gloves discussed. She gets this year round.          BPPV (benign paroxysmal positional vertigo)     Gets sx every day. She did see the vestibular therapist.  She has trained her body so that it is tolerable. I has improved with pt/ ot. She does not want to do more pt/ ot because it is fine now.          Palpitations     Had CAROLE hook up monitor and had work up all was normal. Still gets palpitations but declined cardiology consult since nothing seen on monitor.         Throat pain     ent work up including nasogastric scope all normal and ct throat normal. Jasmin says this might be part of her chronic fatigue.           Other Visit Diagnoses     Screen for colon cancer    -  Primary    Relevant Orders    Ambulatory referral for Colonoscopy    Screening for metabolic disorder        Relevant Orders    Comprehensive Metabolic Panel (Completed)    Screening, anemia, deficiency, iron        Relevant Orders    HM1(CBC and Differential) (Completed)    HM1 (CBC with Diff) (Completed)    Screening, lipid        Relevant Orders    Lipid Cascade (Completed)    Screening for thyroid disorder         Relevant Orders    Thyroid [place]s (Completed)    Screening for endocrine, nutritional, metabolic and immunity disorder        Relevant Orders    Vitamin D, Total (25-Hydroxy) (Completed)    Screening for diabetes mellitus               I have had an Advance Directives discussion with the patient.     Subjective:      Jasmin Osborne is a 52 y.o. female who presents for an annual exam. The patient is sexually active. The patient participates in regular exercise: yes. The patient reports that there is not domestic violence in her life.     Healthy Habits:   Regular Exercise: Yes  Sunscreen Use: Yes  Healthy Diet: Yes  Dental Visits Regularly: Yes  Seat Belt: Yes  Sexually active: Yes  Self Breast Exam Monthly:N/A. Had breast cancer.  Hemoccults: No  Flex Sig: No  Colonoscopy: DUE  Lipid Profile: Yes  Glucose Screen: Yes  Prevention of Osteoporosis: Yes  Last Dexa: N/A  Guns at Home:  No      Immunization History   Administered Date(s) Administered     Influenza, Seasonal, Inj PF IIV3 2015     Influenza, inj, historic,unspecified 11/10/2016, 10/05/2017     Tdap 2015     Immunization status: up to date and documented.    No exam data present    Gynecologic History  Patient's last menstrual period was 04/10/2018 (approximate).  Contraception: none  Last Pap: . Results were: normal, negative HPV  Last mammogram: History of breast cancer. Results were: N/A      OB History    Para Term  AB Living   1 1 1   1   SAB TAB Ectopic Multiple Live Births             # Outcome Date GA Lbr Mikie/2nd Weight Sex Delivery Anes PTL Lv   1 Term /09    M              Current Outpatient Prescriptions   Medication Sig Dispense Refill     tamoxifen (NOLVADEX) 20 MG tablet Take 20 mg by mouth 2 (two) times a day.       No current facility-administered medications for this visit.      Past Medical History:   Diagnosis Date     Mononucleosis     severe was hospitalized.     Past Surgical History:  "  Procedure Laterality Date      SECTION       mastectomy with reconstruction       tonsils removed       Macrobid [nitrofurantoin monohyd/m-cryst] and Sulfa (sulfonamide antibiotics)  Family History   Problem Relation Age of Onset     Cancer Mother 74     pancreatic     Breast cancer Sister 56     Social History     Social History     Marital status:      Spouse name: Kritsopher     Number of children: 1     Years of education: masters social work.      Occupational History     Director of Clinical services at [employer]     Social History Main Topics     Smoking status: Never Smoker     Smokeless tobacco: Never Used     Alcohol use 0.0 - 1.2 oz/week     0 - 2 Glasses of wine per week     Drug use: No     Sexual activity: Yes     Partners: Male     Birth control/ protection: None, Other      Comment: she is perimenopausal she is aware that pregnancy can occur.     Other Topics Concern     Not on file     Social History Narrative     No narrative on file       Review of Systems - see ros on pt annual exam form  Review of Systems - complete ros performed - all concerns were addressed today or follow up planned.           Objective:         Vitals:    18 1457   BP: 108/72   Pulse: 64   Resp: 12   Weight: 149 lb (67.6 kg)   Height: 5' 5\" (1.651 m)     Body mass index is 24.79 kg/(m^2).    Physical  Physical Exam   Constitutional: She is oriented to person, place, and time. She appears well-nourished.   HENT:   Head: Normocephalic.   Eyes: Conjunctivae and EOM are normal. Pupils are equal, round, and reactive to light.   Neck: Normal range of motion. Neck supple.   Cardiovascular: Normal rate, regular rhythm, normal heart sounds and intact distal pulses.    Pulmonary/Chest: Effort normal and breath sounds normal. Right breast exhibits no inverted nipple, no mass, no nipple discharge, no skin change and no tenderness. Left breast exhibits no inverted nipple, no mass, " no nipple discharge, no skin change and no tenderness. Breasts are symmetrical.   Abdominal: Soft. Bowel sounds are normal.   Musculoskeletal: Normal range of motion.   Neurological: She is alert and oriented to person, place, and time.   Skin: Skin is warm and dry.   Psychiatric: She has a normal mood and affect. Her behavior is normal. Judgment and thought content normal.   Nursing note and vitals reviewed.

## 2021-06-25 ENCOUNTER — OFFICE VISIT - RIVER FALLS (OUTPATIENT)
Dept: FAMILY MEDICINE | Facility: CLINIC | Age: 56
End: 2021-06-25

## 2021-06-26 ENCOUNTER — HEALTH MAINTENANCE LETTER (OUTPATIENT)
Age: 56
End: 2021-06-26

## 2021-07-08 ENCOUNTER — OFFICE VISIT - RIVER FALLS (OUTPATIENT)
Dept: FAMILY MEDICINE | Facility: CLINIC | Age: 56
End: 2021-07-08

## 2021-07-09 LAB
A/G RATIO - HISTORICAL: 1.5 MG/DL
ALBUMIN SERPL-MCNC: 4.1 G/DL
ALP SERPL-CCNC: 64 UNIT/L
ALT SERPL W P-5'-P-CCNC: 16 UNIT/L
ANION GAP SERPL CALCULATED.3IONS-SCNC: 11 MEQ/L
AST SERPL W P-5'-P-CCNC: 22 UNIT/L
BASOPHILS # BLD MANUAL: 0 CELLS/UL
BASOPHILS NFR BLD AUTO: 0.5 %
BILIRUB DIRECT SERPL-MCNC: 0.2 MG/DL
BILIRUB SERPL-MCNC: 0.4 MG/DL
BUN SERPL-MCNC: 12 MG/DL
BUN/CREAT RATIO - HISTORICAL: 18
CALCIUM SERPL-MCNC: 8.9 MEQ/DL
CHLORIDE BLD-SCNC: 107 MEQ/L
CO2 SERPL-SCNC: 22 MEQ/L
CREAT SERPL-MCNC: 0.68 MG/DL
EOSINOPHIL # BLD MANUAL: 0.3 CELLS/UL
EOSINOPHIL NFR BLD AUTO: 3.5 %
ERYTHROCYTE [DISTWIDTH] IN BLOOD BY AUTOMATED COUNT: 14.3 %
GLOBULIN: 2.8 G/DL
GLUCOSE BLD-MCNC: 77 MG/DL
HCT VFR BLD AUTO: 39.5 %
HGB BLD-MCNC: 13.2 G/DL
LYMPHOCYTES # BLD MANUAL: 3.3 CELLS/UL
LYMPHOCYTES NFR BLD AUTO: 38.5 %
MCH RBC QN AUTO: 29 PG
MCHC RBC AUTO-ENTMCNC: 33.4 GM/DL
MCV RBC AUTO: 87 FL
MONOCYTES # BLD MANUAL: 0.9 CELLS/UL
MONOCYTES NFR BLD AUTO: 10.7 %
NEUTROPHILS # BLD MANUAL: 4 CELLS/UL
NEUTROPHILS NFR BLD AUTO: 46.8 %
PH UR: 5.5 [PH]
PLATELET # BLD AUTO: 256 X10
PMV BLD: 9.5 FL
POTASSIUM BLD-SCNC: 3.8 MEQ/L
PROT SERPL-MCNC: 6.9 GM/DL
RBC # BLD AUTO: 4.55 X10
SODIUM SERPL-SCNC: 140 MEQ/L
SP GR UR STRIP: 1.01
WBC # BLD AUTO: 8.5 X10

## 2021-07-28 ENCOUNTER — OFFICE VISIT - RIVER FALLS (OUTPATIENT)
Dept: FAMILY MEDICINE | Facility: CLINIC | Age: 56
End: 2021-07-28

## 2021-07-30 ENCOUNTER — OFFICE VISIT - RIVER FALLS (OUTPATIENT)
Dept: FAMILY MEDICINE | Facility: CLINIC | Age: 56
End: 2021-07-30

## 2021-07-30 ASSESSMENT — MIFFLIN-ST. JEOR: SCORE: 1306.68

## 2021-07-31 ENCOUNTER — COMMUNICATION - RIVER FALLS (OUTPATIENT)
Dept: FAMILY MEDICINE | Facility: CLINIC | Age: 56
End: 2021-07-31

## 2021-07-31 ENCOUNTER — TRANSFERRED RECORDS (OUTPATIENT)
Dept: HEALTH INFORMATION MANAGEMENT | Facility: CLINIC | Age: 56
End: 2021-07-31

## 2021-07-31 LAB
BACTERIA SPEC CULT: ABNORMAL
HPV ABSTRACT: NORMAL

## 2021-10-11 ENCOUNTER — HEALTH MAINTENANCE LETTER (OUTPATIENT)
Age: 56
End: 2021-10-11

## 2022-01-10 ENCOUNTER — OFFICE VISIT - RIVER FALLS (OUTPATIENT)
Dept: FAMILY MEDICINE | Facility: CLINIC | Age: 57
End: 2022-01-10

## 2022-01-10 ENCOUNTER — MEDICAL CORRESPONDENCE (OUTPATIENT)
Dept: HEALTH INFORMATION MANAGEMENT | Facility: CLINIC | Age: 57
End: 2022-01-10

## 2022-01-17 ENCOUNTER — AMBULATORY - RIVER FALLS (OUTPATIENT)
Dept: FAMILY MEDICINE | Facility: CLINIC | Age: 57
End: 2022-01-17

## 2022-01-19 ENCOUNTER — MEDICAL CORRESPONDENCE (OUTPATIENT)
Dept: HEALTH INFORMATION MANAGEMENT | Facility: CLINIC | Age: 57
End: 2022-01-19

## 2022-02-01 ENCOUNTER — LAB REQUISITION (OUTPATIENT)
Dept: LAB | Facility: CLINIC | Age: 57
End: 2022-02-01

## 2022-02-01 PROCEDURE — U0003 INFECTIOUS AGENT DETECTION BY NUCLEIC ACID (DNA OR RNA); SEVERE ACUTE RESPIRATORY SYNDROME CORONAVIRUS 2 (SARS-COV-2) (CORONAVIRUS DISEASE [COVID-19]), AMPLIFIED PROBE TECHNIQUE, MAKING USE OF HIGH THROUGHPUT TECHNOLOGIES AS DESCRIBED BY CMS-2020-01-R: HCPCS | Performed by: INTERNAL MEDICINE

## 2022-02-02 LAB — SARS-COV-2 RNA RESP QL NAA+PROBE: NEGATIVE

## 2022-02-12 VITALS
DIASTOLIC BLOOD PRESSURE: 56 MMHG | HEART RATE: 68 BPM | TEMPERATURE: 97.7 F | SYSTOLIC BLOOD PRESSURE: 94 MMHG | BODY MASS INDEX: 24.79 KG/M2 | WEIGHT: 149 LBS

## 2022-02-12 VITALS
HEART RATE: 90 BPM | DIASTOLIC BLOOD PRESSURE: 60 MMHG | TEMPERATURE: 97.6 F | BODY MASS INDEX: 25.09 KG/M2 | WEIGHT: 150.6 LBS | HEIGHT: 65 IN | OXYGEN SATURATION: 97 % | SYSTOLIC BLOOD PRESSURE: 120 MMHG

## 2022-02-12 VITALS
DIASTOLIC BLOOD PRESSURE: 64 MMHG | TEMPERATURE: 97.5 F | WEIGHT: 150 LBS | HEIGHT: 65 IN | SYSTOLIC BLOOD PRESSURE: 116 MMHG | HEART RATE: 75 BPM | OXYGEN SATURATION: 98 % | BODY MASS INDEX: 24.99 KG/M2

## 2022-02-12 VITALS
HEART RATE: 82 BPM | WEIGHT: 152.2 LBS | OXYGEN SATURATION: 99 % | BODY MASS INDEX: 25.33 KG/M2 | BODY MASS INDEX: 24.53 KG/M2 | HEIGHT: 65 IN | TEMPERATURE: 98.4 F | HEIGHT: 65 IN | DIASTOLIC BLOOD PRESSURE: 76 MMHG | SYSTOLIC BLOOD PRESSURE: 102 MMHG | BODY MASS INDEX: 25.33 KG/M2

## 2022-02-12 VITALS
HEART RATE: 60 BPM | DIASTOLIC BLOOD PRESSURE: 68 MMHG | TEMPERATURE: 98.9 F | BODY MASS INDEX: 24.46 KG/M2 | WEIGHT: 147 LBS | SYSTOLIC BLOOD PRESSURE: 90 MMHG

## 2022-02-12 VITALS
TEMPERATURE: 97.9 F | RESPIRATION RATE: 16 BRPM | WEIGHT: 155.5 LBS | HEART RATE: 89 BPM | OXYGEN SATURATION: 98 % | HEIGHT: 65 IN | WEIGHT: 156.8 LBS | WEIGHT: 155.6 LBS | SYSTOLIC BLOOD PRESSURE: 109 MMHG | WEIGHT: 157.3 LBS | DIASTOLIC BLOOD PRESSURE: 79 MMHG | SYSTOLIC BLOOD PRESSURE: 100 MMHG | HEART RATE: 61 BPM | TEMPERATURE: 97.6 F | SYSTOLIC BLOOD PRESSURE: 139 MMHG | TEMPERATURE: 100.3 F | DIASTOLIC BLOOD PRESSURE: 76 MMHG | HEART RATE: 62 BPM | DIASTOLIC BLOOD PRESSURE: 72 MMHG | BODY MASS INDEX: 26.09 KG/M2 | SYSTOLIC BLOOD PRESSURE: 116 MMHG | HEART RATE: 85 BPM | BODY MASS INDEX: 25.88 KG/M2 | BODY MASS INDEX: 25.92 KG/M2 | DIASTOLIC BLOOD PRESSURE: 76 MMHG | BODY MASS INDEX: 26.18 KG/M2

## 2022-02-12 VITALS
SYSTOLIC BLOOD PRESSURE: 90 MMHG | TEMPERATURE: 98.4 F | HEIGHT: 65 IN | DIASTOLIC BLOOD PRESSURE: 52 MMHG | HEART RATE: 70 BPM | WEIGHT: 147 LBS | OXYGEN SATURATION: 99 % | BODY MASS INDEX: 24.49 KG/M2

## 2022-02-12 VITALS
SYSTOLIC BLOOD PRESSURE: 110 MMHG | DIASTOLIC BLOOD PRESSURE: 64 MMHG | HEIGHT: 65 IN | WEIGHT: 151.4 LBS | BODY MASS INDEX: 25.22 KG/M2 | HEART RATE: 75 BPM | TEMPERATURE: 98.8 F

## 2022-02-15 NOTE — LETTER
(Inserted Image. Unable to display)   August 03, 2021      ABIGAIL HAMMONDS  [address] [place] [address]        Dear ABIGAIL,      Thank you for selecting [facility] for your healthcare needs.     Normal pap smear.        Result Name Current Result   ThinPrep PAP with HPV   7/28/2021       Please contact me or my assistant at [phone] if you have any questions or concerns.     Sincerely,

## 2022-02-15 NOTE — PROGRESS NOTES
Chief Complaint    Vertigo starting this morning. Has gone to PT in the past for vertigo.  History of Present Illness      Chief complaint as above reviewed and confirmed with patient.  Pt presents to the clinic with concerns re: vertigo.  She has a long history of having BPPV which has been intermittent.  Has been worse in the last day.  HAs had good improvement with meclizine as well as PT in the past and would like refill of the meclizine as well as PT referral.  Denies any ear congestion/fullness/hearing changes/tinnitus.  Room spinning dizziness is consistent with previous episodes and related to movement of the head, rolling over in bed, bending, etc.  Review of Systems      Review of systems is negative with the exception of those noted in HPI          Physical Exam   Vitals & Measurements    T: 98.4   F (Tympanic)  HR: 70(Peripheral)  BP: 90/52  SpO2: 99%     HT: 65 in  WT: 147 lb  BMI: 24.46       no additional exam done as concerned about provoking sx more and not able to drive home  Assessment/Plan       BPPV (benign paroxysmal positional vertigo) (H81.10)         referral to PT for canalith repositioning as well as refill of meclizine.  If not improving, worsening or problems with hearing changes, fullness in the ears, tinnitus should be rechecked by pcp.         Ordered:          Physical Therapy (Request), Priority: Soon, BPPV (benign paroxysmal positional vertigo)                Orders:         meclizine, = 1 tab(s) ( 25 mg ), PO, TID, PRN: for dizziness, # 30 tab(s), 1 Refill(s), Type: Maintenance, Pharmacy: [id number], 1 tab(s) Oral tid,PRN:for dizziness, (Ordered)  Patient Information     Name:ABIGAIL SWARTZ      Address:      [address] [place] [id number]     Sex:Female     YOB: 1965     [phone]     Emergency Contact:CASSIUS HAYLEY RHYS     MRN:[id number]     FIN:[id number]     Location:[facility]     Date of  Service:2019      Primary Care Physician:       Anshul Armendariz MD, [phone]      Attending Physician:       Jazmine Jiménez PA-C, [phone]  Problem List/Past Medical History    Ongoing     Acne     ASCUS on PAP Smear     Breast cancer     Microscopic Colitis     Mild Depression     Raynaud's disease    Historical     Mononucleosis       Comments: Hospitalization in high school and repeat bout in college.     Pregnancy     Ruptured Ovarian Cyst     Tobacco abuse       Comments: Quit  Procedure/Surgical History      section ()            Comments: G1, P1.     Colonoscopy (2007)           Breast surgery           Extraction of wisdom tooth           Tonsillectomy        Medications     tamoxifen: po, daily.     Fish Oil: Oral, 0 Refill(s).     meclizine 25 mg oral tablet: 25 mg, 1 tab(s), PO, TID, PRN: for dizziness, 30 tab(s), 1 Refill(s).          Allergies    Keflex (pain in left kidney area)    Macrobid (Rash)    metals    sulfa drug (Rash)  Social History    Smoking Status - 2019     Never smoker     Alcohol      Current, 1-2 times per month, 2 drinks/episode average., 2012     Employment and Education      Employed, Work/School description: ., 2012     Exercise and Physical Activity      Exercise frequency: 3-6 times per week. Exercise type: Bicycling, Running, Walking, Weight lifting., 2012     Home and Environment      Marital status: . Spouse/Partner name: Kristopher Osborne. 1 children., 2012     Sexual      Sexually active: Yes. Sexual orientation: Heterosexual. Other contraceptive use: OCP., 2012     Substance Abuse      Never, 2012     Tobacco      Never, 2012  Family History    Cancer of bowel: Grandmother (M).    Diabetes mellitus: Grandfather (M).    Brother: History is negative    Sister: History is negative    Father: History is negative    Brother: History is negative    Son: History is  negative  Immunizations      Vaccine Date Status      influenza virus vaccine, inactivated 02/06/2015 Given      tetanus/diphth/pertuss (Tdap) adult/adol 02/06/2015 Given      Td 12/01/2004 Recorded      MMR (measles/mumps/rubella) 12/04/2003 Recorded      Hep B 06/01/1998 Recorded      Hep B 01/01/1998 Recorded      Hep B 12/01/1997 Recorded      Td 11/01/1994 Recorded      Td 01/01/1983 Recorded

## 2022-02-15 NOTE — NURSING NOTE
Comprehensive Intake Entered On:  7/30/2021 2:32 PM CDT    Performed On:  7/30/2021 2:29 PM CDT by Eden Sánchez               Summary   Chief Complaint :   Painful urination, blood in urine, abdominal pain.  Bodyaches, chills.  Multiple visits in the last month.  Taking Azo, cranberry, increased fluids.    Menstrual Status :   Menarcheal   Weight Measured :   155.6 lb(Converted to: 155 lb 10 oz, 70.579 kg)    Height Measured :   65 in(Converted to: 5 ft 5 in, 165.10 cm)    Body Mass Index :   25.89 kg/m2 (HI)    Body Surface Area :   1.8 m2   Systolic Blood Pressure :   116 mmHg   Diastolic Blood Pressure :   79 mmHg   Mean Arterial Pressure :   91 mmHg   Peripheral Pulse Rate :   89 bpm   BP Method :   Electronic   HR Method :   Electronic   Temperature Tympanic :   100.3 DegF(Converted to: 37.9 DegC)    Languages :   English   Eden Sánchez - 7/30/2021 2:29 PM CDT   Health Status   Allergies Verified? :   Yes   Medication History Verified? :   Yes   Immunizations Current :   Yes   Pre-Visit Planning Status :   Completed   Tobacco Use? :   Never smoker   Eden Sánchez - 7/30/2021 2:29 PM CDT   Meds / Allergies   (As Of: 7/30/2021 2:32:53 PM CDT)   Allergies (Active)   Keflex  Estimated Onset Date:   Unspecified ; Reactions:   pain in left kidney area ; Created By:   Rubi Rosado CMA; Reaction Status:   Active ; Category:   Drug ; Substance:   Keflex ; Type:   Allergy ; Updated By:   Rubi Rosado CMA; Source:   Paper Chart ; Reviewed Date:   7/30/2021 2:30 PM CDT      Macrobid  Estimated Onset Date:   Unspecified ; Reactions:   Rash ; Created By:   Rubi Rosado CMA; Reaction Status:   Active ; Category:   Drug ; Substance:   Macrobid ; Type:   Allergy ; Updated By:   Rubi Rosado CMA; Source:   Paper Chart ; Reviewed Date:   7/30/2021 2:30 PM CDT      metals  Estimated Onset Date:   Unspecified ; Created By:   Rose Moncada CMA; Reaction Status:   Active ; Category:    Other ; Substance:   metals ; Type:   Allergy ; Updated By:   Rose Moncada CMA; Reviewed Date:   7/8/2021 7:07 AM CDT      sulfa drug  Estimated Onset Date:   Unspecified ; Reactions:   Rash ; Created By:   Rubi Larsen; Reaction Status:   Active ; Category:   Drug ; Substance:   sulfa drug ; Type:   Allergy ; Updated By:   Rubi Larsen; Source:   Paper Chart ; Reviewed Date:   7/8/2021 7:07 AM CDT        Medication List   (As Of: 7/30/2021 2:32:53 PM CDT)   Home Meds    ubiquinone  :   ubiquinone ; Status:   Documented ; Ordered As Mnemonic:   Co Q-10 ; Simple Display Line:   100 mg, Oral, daily, 0 Refill(s) ; Catalog Code:   ubiquinone ; Order Dt/Tm:   7/8/2021 7:07:35 AM CDT          calcium-vitamin D  :   calcium-vitamin D ; Status:   Documented ; Ordered As Mnemonic:   Calcium 600+D ; Simple Display Line:   Oral, bid, 0 Refill(s) ; Catalog Code:   calcium-vitamin D ; Order Dt/Tm:   9/19/2019 5:44:04 PM CDT          multivitamin  :   multivitamin ; Status:   Documented ; Ordered As Mnemonic:   Multiple Vitamins oral tablet ; Simple Display Line:   Oral, daily, 0 Refill(s) ; Catalog Code:   multivitamin ; Order Dt/Tm:   9/19/2019 5:43:41 PM CDT          omega-3 polyunsaturated fatty acids  :   omega-3 polyunsaturated fatty acids ; Status:   Documented ; Ordered As Mnemonic:   Fish Oil ; Simple Display Line:   Oral, 0 Refill(s) ; Catalog Code:   omega-3 polyunsaturated fatty acids ; Order Dt/Tm:   3/18/2019 4:42:43 PM CDT

## 2022-02-15 NOTE — PROGRESS NOTES
Chief Complaint    Rash on R hip and buttocks - noticed about a week ago. Initially thought bug bites. Some burning and itching.  History of Present Illness      Patient is a 54-year-old female who complains of red area for 7 to 10 days on her right buttock region.  At first she thought it was bug bites or spider bites.  It has been kind of itchy.  It seemed to be getting a little bit better but now it is starting in a new area the rash seems to be spreading.  Again it has come and itch/burn feeling to it.  It is not painful.      Her  has cold sores but the patient has never had cold sores before.      No fevers.  no fatigue or myalgia.  no headache.  Review of Systems      Negative except as listed in HPI.  Physical Exam   Vitals & Measurements    T: 97.7   F (Tympanic)  HR: 68(Peripheral)  BP: 94/56     WT: 149 lb       Vitals noted and normal.      Patient is alert, oriented and in no acute distress.      Skin: lateral right hip area with [id number] area of blanching erythema in scattered clusters.  a couple areas with possible early blister.  no pustules.  no tenderness.      area about [id number] on right posterior, upper leg with lighter erythema in the same pattern.  Assessment/Plan       Herpes dermatitis (B00.89)         no swimming/hot tub until clear (travel planned this weekend)        Follow up if not improving as anticipated.          Ordered:          valACYclovir, = 2 tab(s) ( 1,000 mg ), Oral, q8 hrs, x 10 day(s), # 60 tab(s), 0 Refill(s), Type: Acute, Pharmacy: [id number], 2 tab(s) Oral q8 hrs,x10 day(s), (Ordered)           Patient Information     Name:ABIGAIL SWARTZ      Address:      [address] [place] [id number]     Sex:Female     YOB: 1965     [phone]     Emergency Contact:HAYLEY HAMMONDS     MRN:[id number]     FIN:[id number]     Location:[facility]     Date of Service:12/30/2019      Primary Care  Physician:       Anshul Armendariz MD, [phone]      Attending Physician:       Ally Higginbotham MD, [phone]  Problem List/Past Medical History    Ongoing     Acne     ASCUS on PAP Smear     Breast cancer     Microscopic Colitis     Mild Depression     Raynaud's disease    Historical     Mononucleosis       Comments: Hospitalization in high school and repeat bout in college.     Pregnancy     Ruptured Ovarian Cyst     Tobacco abuse       Comments: Quit  Procedure/Surgical History      section ()            Comments: G1, P1.     Colonoscopy (2007)           Breast surgery           Extraction of wisdom tooth           Tonsillectomy        Medications    Calcium 600+D, Oral, bid    Fish Oil, Oral    Multiple Vitamins oral tablet, Oral, daily    tamoxifen 20 mg oral tablet    Valtrex 500 mg oral tablet, 1000 mg= 2 tab(s), Oral, q8 hrs  Allergies    Keflex (pain in left kidney area)    Macrobid (Rash)    metals    sulfa drug (Rash)  Social History    Smoking Status - 2019     Never smoker     Alcohol      Current, 1-2 times per month, 2 drinks/episode average., 2012     Employment/School      Employed, Work/School description: ., 2012     Exercise      Exercise frequency: 3-6 times per week. Exercise type: Bicycling, Running, Walking, Weight lifting., 2012     Home/Environment      Marital status: . Spouse/Partner name: Kristopher Osborne. 1 children., 2012     Sexual      Sexually active: Yes. Sexual orientation: Heterosexual. Other contraceptive use: OCP., 2012     Substance Abuse      Never, 2012     Tobacco      Never, 2012  Family History    Cancer of bowel: Grandmother (M).    Diabetes mellitus: Grandfather (M).    Son: History is negative    Father: History is negative    Sister: History is negative    Brother: History is negative    Brother: History is negative  Immunizations      Vaccine Date Status          influenza virus  vaccine, inactivated 02/06/2015 Given          tetanus/diphth/pertuss (Tdap) adult/adol 02/06/2015 Given          Td 12/01/2004 Recorded          MMR (measles/mumps/rubella) 12/04/2003 Recorded          Hep B 06/01/1998 Recorded          Hep B 01/01/1998 Recorded          Hep B 12/01/1997 Recorded          Td 11/01/1994 Recorded          Td 01/01/1983 Recorded

## 2022-02-15 NOTE — LETTER
(Inserted Image. Unable to display) July 13, 2021Re: ABIGAIL BOOKER CASSIUSDOB:  1965  Attn: Scheduling  [facility] [address] [place] [id number]:   Attn: SchedulingThe following patient has been referred to your office:  ABIGAIL HAMMONDS.  Appointment PendingPlease refer to the attached clinical documentation for a summary of ABIGAIL's care.  Please do no hesitate to contact our office if any additional clinical questions arise.  All relevant records and transition of care documents should be mailed or faxed.Your assistance in providing continuity of care is appreciated.Sincerely, [address]  [address](P) [phone](F) [phone]

## 2022-02-15 NOTE — TELEPHONE ENCOUNTER
---------------------  From: Kavya Vo CMA   To: Jessy ALEMAN, Jazmine ROBERTS;     Cc: Phone Messages Pool ([id number]_[facility]); TAPulselocker Message Pool ([id number]_[facility]);      Sent: 7/31/2021 7:46:44 AM CDT  Subject: Abnormal lab     Patient saw TFS 7/30/21 and given Levaquin 500mg 1 tab po daily x7 days. Allergies: Keflex, Macrobid and Sulfa. Advise        Results:  Date Result Name Ind Value   7/28/2021 8:52 AM Urine Culture ((A)) See comment     1.)  (Medium Importance) Result Comment by [employer] Domain User for [id number] on July 31, 2021 1:49 AM CDT    CULTURE, URINE, ROUTINE         Micro Number:      [id number]    Test Status:       Final    Specimen Source:   Urine    Specimen Quality:  Adequate    Result:            10,000-49,000 CFU/mL of Escherichia coli      COMMENT:           Additional non-predominating organism(s) isolated.                       These organisms, commonly found on external and                       internal genitalia, are considered colonizers. No                       further testing performed.                              E.coli                            ----------------                            INT   MATIAS     AMOX/CLAVULANATE       S     4     AMPICILLIN             S     8     AMP/SULBACTAM          S     <=2     CEFAZOLIN              NR    <=4 **2     CEFEPIME               S     <=1     CEFTRIAXONE            S     <=1     CIPROFLOXACIN          S     <=0.25     ERTAPENEM              S     <=0.5     GENTAMICIN             S     <=1     IMIPENEM               S     <=0.25     LEVOFLOXACIN           S     <=0.12     NITROFURANTOIN         S     <=16     PIP/TAZOBACTAM         S     <=4     TOBRAMYCIN             S     <=1     TRIMETHOPRIM/SULFA     S     <=20    S=Susceptible  I=Intermediate  R=Resistant  * = Not Tested  NR = Not Reported  **NN = See Therapy Comments      THERAPY COMMENTS        Note 1:      For infections other than uncomplicated UTI      caused by E. coli, K.  pneumoniae or P. mirabilis:      Cefazolin is resistant if MATIAS > or = 8 mcg/mL.      (Distinguishing susceptible versus intermediate      for isolates with MATIAS < or = 4 mcg/mL requires      additional testing.)        Note 2:      For uncomplicated UTI caused by E. coli,      K. pneumoniae or P. mirabilis: Cefazolin is      susceptible if MATIAS <32 mcg/mL and predicts      susceptible to the oral agents cefaclor, cefdinir,      cefpodoxime, cefprozil, cefuroxime, cephalexin      and loracarbef.  Lab test performed by:  Lab Mnemonic:YAJAIRA  [facility]  [address] [place] [address]  Henri Newton M.D.---------------------  From: Jessy ALEMAN, Jazmine ROBERTS   To: Kavya Vo CMA;     Cc: Phone xAd Pool ([id number]_[employer]); [employer] Message Pool ([id number]);      Sent: 7/31/2021 8:12:35 AM CDT  Subject: RE: Abnormal lab     On Levaquin, appropriate, no change needed.

## 2022-02-15 NOTE — PROGRESS NOTES
Patient:   ABIGAIL SWARTZ            MRN: [id number]            FIN: [id number]               Age:   55 years     Sex:  Female     :  1965   Associated Diagnoses:   Menorrhagia   Author:   Aman Joseph MD      Visit Information      Date of Service: 2021 08:29 am  Performing Location: [facility]  Encounter#: [id number]      Primary Care Provider (PCP):  Anshul Armendariz MD    NPI# [id number]      Referring Provider:  Aman Joseph MD    NPI# [id number]      Chief Complaint   2021 8:32 AM CDT    Consult per JDL for irregular and heavy menses. Due for pap this year. Hx of breast ca - bilateral mastectomy - on Tamoxifen for 5 years.      Interval History   The patient is a 55-year-old, para 2, status post  12 years ago.  She is in today for evaluation of abnormal uterine bleeding.  The patient has a history of breast cancer and was treated with tamoxifen up until about a year and a half ago when she completed therapy.  Her periods were fairly light and infrequent on tamoxifen and then went to her current pattern which is almost every month and when bleeding rather heavy during her period followed by a week or so of spotting or light bleeding.  This has been a persistent issue and she has concerns regarding it.  She had more normal periods prior to tamoxifen therapy.  She did have an episode of abdominal pain in  and underwent CT of her abdomen and pelvis and pelvis was reported as normal at that time.  She does not have any pain currently.  She did not have any abnormal bleeding evaluated when she was on the tamoxifen.        Review of Systems   Review  of systems is negative except as documented under interval history.      Health Status   Allergies:    Allergic Reactions (Selected)  Severity Not Documented  Keflex (Pain in left kidney area)  Macrobid (Rash)  Metals (No reactions were documented)  Sulfa drug (Rash)   Medications:  (Selected)   Documented  Medications  Documented  Calcium 600+D: Oral, bid, 0 Refill(s), Type: Maintenance  Co Q-10: ( 100 mg ), Oral, daily, 0 Refill(s), Type: Maintenance  Fish Oil: Oral, 0 Refill(s), Type: Maintenance  Multiple Vitamins oral tablet: Oral, daily, 0 Refill(s), Type: Maintenance   Problem list:    All Problems  Acne / ICD-9-.1 / Confirmed  Microscopic Colitis / ICD-9-.9 / Confirmed  Mild Depression / ICD-9-.21 / Confirmed  ASCUS on PAP Smear / ICD-9-.01 / Confirmed  Raynaud's disease / SNOMED CT 0633646444 / Confirmed  Breast cancer / SNOMED CT 330391181 / Confirmed  Cyst of pancreas / SNOMED CT 93821505 / Confirmed  Umbilical hernia / SNOMED CT 3013355605 / Confirmed  Family history of pancreatic cancer / SNOMED CT 7335162146 / Confirmed  Resolved: Tobacco abuse / SNOMED CT 364740833  Resolved: Mononucleosis / ICD-9-  Resolved: Ruptured Ovarian Cyst / ICD-9-.2  Resolved: Pregnancy / SNOMED CT 405169733      Histories   Past Medical History:    Active  ASCUS on PAP Smear (795.01): Onset in the month of 2008 at 42 years  Acne (706.1)  Microscopic Colitis (558.9)  Mild Depression (296.21)  Raynaud's disease (0507783804)  Breast cancer (657584572)  Resolved  Ruptured Ovarian Cyst (620.2): Onset in  at 22 years.  Resolved.  Tobacco abuse (635441184):  Resolved in  at 32 years.  Comments:  3/17/2010 CDT 4:15 PM CDT - Rubi Rosado CMA  Mononucleosis (075):  Resolved.  Comments:  3/17/2011 CDT 7:17 AM CDT - Zahra Jasmine  Hospitalization in high school and repeat bout in college.  Pregnancy (993245095):  Resolved on 2009 at 43 years.   Family History:    Cancer of bowel  Grandmother (M)  Diabetes mellitus  Grandfather (M)  Pancreatic cancer  Father  Mother ()  Aunt (P)  Breast Cancer  Sister  Aunt (P)     Procedure history:     section (07628784) in the month of 2009 at 43 Years.  Comments:  3/17/2011 7:24 AM DIOGOT - Zahra Jasmine  G1, P1  Colonoscopy  (248223486) on 5/14/2007 at 41 Years.  Tonsillectomy (083202190).  Extraction of wisdom tooth (856730092).  Breast surgery.   Social History:        Electronic Cigarette/Vaping Assessment            Electronic Cigarette Use: Never.      Alcohol Assessment            Current, 1-2 times per month, 2 drinks/episode average.      Tobacco Assessment            Never (less than 100 in lifetime)      Substance Abuse Assessment            Never      Employment and Education Assessment            Employed, Work/School description: .      Home and Environment Assessment            Marital status: .  Spouse/Partner name: Kristopher Osborne.  1 children.      Exercise and Physical Activity Assessment            Exercise frequency: 3-6 times per week.  Exercise type: Bicycling, Running, Walking, Weight lifting.      Sexual Assessment            Sexually active: Yes.  Sexual orientation: Heterosexual.  Other contraceptive use: OCP.        Physical Examination   Vital Signs   7/28/2021 8:32 AM CDT Peripheral Pulse Rate 85 bpm    Pulse Site Radial artery    HR Method Electronic    Systolic Blood Pressure 139 mmHg  HI    Diastolic Blood Pressure 76 mmHg    Mean Arterial Pressure 97 mmHg    BP Site Right arm    BP Method Electronic      Gastrointestinal:       Abdomen: Abdomen is soft and nontender without masses.  Ultrasound is performed.  .       Review / Management   Radiology results   Ultrasound, Combined abdominal and vaginal probe ultrasound finds the uterus 9 x 5 x 6 cm.  Endometrium is 1.5 cm thick and secretory appearing and fairly homogenous with no obvious polyps or abnormalities of the endometrium.  Myometrium is normal.  There is no fluid in the cul-de-sac.  The right ovary is 4.7 cm in length resulting from a 2 cm irregular follicle within that ovary.  The left ovary could not be found either vaginally or abdominally.        Impression and Plan   Diagnosis     Menorrhagia (BRP57-PJ N92.0).     Abnormal  bleeding following course of tamoxifen therapy without obvious endometrial pathology seen on ultrasound.  The patient is probably still ovulatory with secretory appearing endometrium.  .     Plan:  The patient is okay with observation of bleeding currently.  We did discuss IUD to help with the heavy menses but presumably she will be reaching menopause within the next couple of years and hopefully things will be tolerated over that time..    Patient Instructions:       Counseled: Patient, Regarding diagnosis, Regarding treatment, Verbalized understanding.

## 2022-02-15 NOTE — NURSING NOTE
Comprehensive Intake Entered On:  1/25/2021 11:45 AM CST    Performed On:  1/25/2021 11:42 AM CST by Constance Miller CMA               Summary   Chief Complaint :   Verbal consent given for video visit. Received second Moderna COVID vaccine about 2 weeks ago and has had chronic fatigue since. No rxn to first injection. Has hx of fatigue in past. Never tested positive to COVID.    Languages :   English   Constance Miller CMA - 1/25/2021 11:42 AM CST   Health Status   Allergies Verified? :   Yes   Medication History Verified? :   Yes   Immunizations Current :   Yes   Pre-Visit Planning Status :   Not completed   Tobacco Use? :   Never smoker   Constance Miller CMA - 1/25/2021 11:42 AM CST   Meds / Allergies   (As Of: 1/25/2021 11:45:17 AM CST)   Allergies (Active)   Keflex  Estimated Onset Date:   Unspecified ; Reactions:   pain in left kidney area ; Created By:   Rubi Larsen; Reaction Status:   Active ; Substance:   Keflex ; Type:   Allergy ; Updated By:   Rubi Larsen; Source:   Paper Chart ; Reviewed Date:   9/12/2020 8:55 AM CDT      Macrobid  Estimated Onset Date:   Unspecified ; Reactions:   Rash ; Created By:   Rubi Larsen; Reaction Status:   Active ; Substance:   Macrobid ; Type:   Allergy ; Updated By:   Rubi Larsen; Source:   Paper Chart ; Reviewed Date:   9/12/2020 8:55 AM CDT      metals  Estimated Onset Date:   Unspecified ; Created By:   Rose Moncada CMA; Reaction Status:   Active ; Category:   Other ; Substance:   metals ; Type:   Allergy ; Updated By:   Rose Moncada CMA; Reviewed Date:   9/12/2020 8:55 AM CDT      sulfa drug  Estimated Onset Date:   Unspecified ; Reactions:   Rash ; Created By:   Rubi Larsen; Reaction Status:   Active ; Category:   Drug ; Substance:   sulfa drug ; Type:   Allergy ; Updated By:   Rubi Larsen; Source:   Paper Chart ; Reviewed Date:   9/12/2020 8:55 AM CDT        Medication List   (As Of: 1/25/2021  11:45:17 AM CST)   Home Meds    calcium-vitamin D  :   calcium-vitamin D ; Status:   Documented ; Ordered As Mnemonic:   Calcium 600+D ; Simple Display Line:   Oral, bid, 0 Refill(s) ; Catalog Code:   calcium-vitamin D ; Order Dt/Tm:   9/19/2019 5:44:04 PM CDT          multivitamin  :   multivitamin ; Status:   Documented ; Ordered As Mnemonic:   Multiple Vitamins oral tablet ; Simple Display Line:   Oral, daily, 0 Refill(s) ; Catalog Code:   multivitamin ; Order Dt/Tm:   9/19/2019 5:43:41 PM CDT          omega-3 polyunsaturated fatty acids  :   omega-3 polyunsaturated fatty acids ; Status:   Documented ; Ordered As Mnemonic:   Fish Oil ; Simple Display Line:   Oral, 0 Refill(s) ; Catalog Code:   omega-3 polyunsaturated fatty acids ; Order Dt/Tm:   3/18/2019 4:42:43 PM CDT            ID Risk Screen   Recent Travel History :   No recent travel   Family Member Travel History :   No recent travel   Other Exposure to Infectious Disease :   Unknown   COVID-19 Testing Status :   No positive COVID-19 test   Constance Miller CMA - 1/25/2021 11:42 AM CST   Social History   Social History   (As Of: 1/25/2021 11:45:17 AM CST)   Alcohol:        Current, 1-2 times per month, 2 drinks/episode average.   (Last Updated: 4/3/2012 10:17:40 AM CDT by Rose Moncada CMA)          Tobacco:        Never (less than 100 in lifetime)   (Last Updated: 1/25/2021 11:44:48 AM CST by Constance Miller CMA)          Electronic Cigarette/Vaping:        Electronic Cigarette Use: Never.   (Last Updated: 1/25/2021 11:44:51 AM CST by Constance Miller CMA)          Substance Abuse:        Never   (Last Updated: 4/3/2012 10:18:04 AM CDT by Rose Moncada CMA)          Employment/School:        Employed, Work/School description: .   (Last Updated: 4/3/2012 10:18:20 AM CDT by Rose Moncada CMA)          Home/Environment:        Marital status: .  Spouse/Partner name: Kristopher Osborne.  1 children.   (Last Updated: 4/3/2012 10:18:51 AM CDT  by Rose Moncada CMA)          Exercise:        Exercise frequency: 3-6 times per week.  Exercise type: Bicycling, Running, Walking, Weight lifting.   (Last Updated: 4/3/2012 10:19:26 AM CDT by Rose Moncada CMA)          Sexual:        Sexually active: Yes.  Sexual orientation: Heterosexual.  Other contraceptive use: OCP.   (Last Updated: 4/3/2012 10:23:21 AM CDT by Rose Moncada CMA)

## 2022-02-15 NOTE — NURSING NOTE
Comprehensive Intake Entered On:  6/24/2020 5:09 PM CDT    Performed On:  6/24/2020 5:04 PM CDT by Shital Gillette CMA               Summary   Chief Complaint :   c/o ongoing cough, throat irritation, fatigue for the past week COVID test negative last Wednesday  verbal consent given for video visit   Height Measured :   65 in(Converted to: 5 ft 5 in, 165.10 cm)    Languages :   English   Shital Gillette CMA - 6/24/2020 5:04 PM CDT   Health Status   Allergies Verified? :   Yes   Medication History Verified? :   Yes   Immunizations Current :   Yes   Medical History Verified? :   No   Pre-Visit Planning Status :   Not completed   Tobacco Use? :   Never smoker   Shital Gillette CMA - 6/24/2020 5:04 PM CDT   Consents   Consent for Immunization Exchange :   Consent Granted   Consent for Immunizations to Providers :   Consent Granted   Shital Gillette CMA - 6/24/2020 5:04 PM CDT   Meds / Allergies   (As Of: 6/24/2020 5:09:21 PM CDT)   Allergies (Active)   Keflex  Estimated Onset Date:   Unspecified ; Reactions:   pain in left kidney area ; Created By:   Rubi Larsen; Reaction Status:   Active ; Substance:   Keflex ; Type:   Allergy ; Updated By:   Rubi Larsen; Source:   Paper Chart ; Reviewed Date:   6/24/2020 5:07 PM CDT      Macrobid  Estimated Onset Date:   Unspecified ; Reactions:   Rash ; Created By:   Rubi Larsen; Reaction Status:   Active ; Substance:   Macrobid ; Type:   Allergy ; Updated By:   Rubi Larsen; Source:   Paper Chart ; Reviewed Date:   6/24/2020 5:07 PM CDT      metals  Estimated Onset Date:   Unspecified ; Created By:   Rose Moncada CMA; Reaction Status:   Active ; Category:   Other ; Substance:   metals ; Type:   Allergy ; Updated By:   Rose Moncada CMA; Reviewed Date:   6/24/2020 5:07 PM CDT      sulfa drug  Estimated Onset Date:   Unspecified ; Reactions:   Rash ; Created By:   Rubi Larsen; Reaction Status:   Active ; Category:   Drug ;  Substance:   sulfa drug ; Type:   Allergy ; Updated By:   Rubi Larsen; Source:   Paper Chart ; Reviewed Date:   6/24/2020 5:07 PM CDT        Medication List   (As Of: 6/24/2020 5:09:21 PM CDT)   Home Meds    calcium-vitamin D  :   calcium-vitamin D ; Status:   Documented ; Ordered As Mnemonic:   Calcium 600+D ; Simple Display Line:   Oral, bid, 0 Refill(s) ; Catalog Code:   calcium-vitamin D ; Order Dt/Tm:   9/19/2019 5:44:04 PM CDT          multivitamin  :   multivitamin ; Status:   Documented ; Ordered As Mnemonic:   Multiple Vitamins oral tablet ; Simple Display Line:   Oral, daily, 0 Refill(s) ; Catalog Code:   multivitamin ; Order Dt/Tm:   9/19/2019 5:43:41 PM CDT          omega-3 polyunsaturated fatty acids  :   omega-3 polyunsaturated fatty acids ; Status:   Documented ; Ordered As Mnemonic:   Fish Oil ; Simple Display Line:   Oral, 0 Refill(s) ; Catalog Code:   omega-3 polyunsaturated fatty acids ; Order Dt/Tm:   3/18/2019 4:42:43 PM CDT          tamoxifen  :   tamoxifen ; Status:   Documented ; Ordered As Mnemonic:   tamoxifen 20 mg oral tablet ; Simple Display Line:   0 Refill(s) ; Catalog Code:   tamoxifen ; Order Dt/Tm:   9/19/2019 5:44:28 PM CDT ; Comment:   Responsible Provider: OSIRIS ARGUETA            ID Risk Screen   Recent Travel History :   No recent travel   Family Member Travel History :   No recent travel   Other Exposure to Infectious Disease :   Healthcare exposure to COVID-19 within the last 14 days   Shital Gillette CMA - 6/24/2020 5:04 PM CDT

## 2022-02-15 NOTE — PROGRESS NOTES
"Chief Complaint    f/u CT scan- c/o discomfort  History of Present Illness       Patient is here for ED follow-up.  She presented June 25 with a 4-day history of right abdominal discomfort.  She describes intermittent \"pinching\" discomfort in the right upper abdominal area.  Sometimes worse with movement.  Has had nausea on occasion.  3 days ago had 3 diarrheal stools but generally that has not been a problem.  Periods have become a bit irregular and heavy.  She has family history of breast cancer and pancreatic cancer.  Personal history of umbilical hernia, pancreatic cysts breast cancer, and microscopic colitis.  No rectal bleeding or constipation.  No weight loss.  Review of Systems       No fever, chills, headache, myalgia, cough, dyspnea, chest pain.  Physical Exam   Vitals & Measurements    T: 97.9  F (Tympanic)  HR: 62 (Peripheral)  BP: 109/76     WT: 157.3 lb        Patient is a healthy-appearing woman in no distress.  Alert and oriented.  Eyes appear normal.  H&T exam unremarkable.  Abdomen is soft and nontender.  No mass.  No palpable umbilical hernia.  Assessment/Plan       Breast cancer (C50.919)          Had bilateral mastectomy followed by 5 years of tamoxifen.         Ordered:          08865 office o/p est mod 30-39 min (Charge), Quantity: 1, Family history of pancreatic cancer  Microscopic Colitis  Irregular menses  Breast cancer  RLQ abdominal pain  Cyst of pancreas  Umbilical hernia                Cyst of pancreas (K86.2)          Will require follow-up.  Given her family history I think with a GI consultation.         Ordered:          23475 office o/p est low 20-29 min (Charge), Quantity: 1, RLQ abdominal pain  Cyst of pancreas          74626 office o/p est mod 30-39 min (Charge), Quantity: 1, Family history of pancreatic cancer  Microscopic Colitis  Irregular menses  Breast cancer  RLQ abdominal pain  Cyst of pancreas  Umbilical hernia          Return to Clinic (Request), RFV: MRI " Pancreas and liver, Return in 6 months                Family history of pancreatic cancer (Z80.0)         Ordered:          25491 office o/p est mod 30-39 min (Charge), Quantity: 1, Family history of pancreatic cancer  Microscopic Colitis  Irregular menses  Breast cancer  RLQ abdominal pain  Cyst of pancreas  Umbilical hernia          Referral (Request), 07/08/21 7:30:00 CDT, Referred to: Gastroenterology, Referred to: [facility], Family history of pancreatic cancer  Microscopic Colitis  RLQ abdominal pain  Umbilical hernia                Irregular menses (N92.6)          With the discomfort I think a gynecologic evaluation is in order.         Ordered:          41052 office o/p est mod 30-39 min (Charge), Quantity: 1, Family history of pancreatic cancer  Microscopic Colitis  Irregular menses  Breast cancer  RLQ abdominal pain  Cyst of pancreas  Umbilical hernia          Referral - Internal (Request), 07/08/21 7:32:00 CDT, Referred to: Obstetrics & Gynecology, Referred to: [facility], Reason for referral: Pain and heavy menses, Irregular menses  RLQ abdominal pain                Microscopic Colitis (K52.89)          Seems to be in remission.         Ordered:          87867 office o/p est mod 30-39 min (Charge), Quantity: 1, Family history of pancreatic cancer  Microscopic Colitis  Irregular menses  Breast cancer  RLQ abdominal pain  Cyst of pancreas  Umbilical hernia          Referral (Request), 07/08/21 7:30:00 CDT, Referred to: Gastroenterology, Referred to: [facility], Family history of pancreatic cancer  Microscopic Colitis  RLQ abdominal pain  Umbilical hernia                RLQ abdominal pain (R10.31)          Patient is more in the right upper quadrant by her description today.  ED evaluation with imaging negative.  GI referral.         Ordered:          52990 office o/p est low 20-29 min (Charge), Quantity: 1, RLQ abdominal pain  Cyst of pancreas          37955 office o/p est mod 30-39 min  (Charge), Quantity: 1, Family history of pancreatic cancer  Microscopic Colitis  Irregular menses  Breast cancer  RLQ abdominal pain  Cyst of pancreas  Umbilical hernia          Referral (Request), 07/08/21 7:30:00 CDT, Referred to: Gastroenterology, Referred to: [facility], Family history of pancreatic cancer  Microscopic Colitis  RLQ abdominal pain  Umbilical hernia          Referral - Internal (Request), 07/08/21 7:32:00 CDT, Referred to: Obstetrics & Gynecology, Referred to: [facility], Reason for referral: Pain and heavy menses, Irregular menses  RLQ abdominal pain                Umbilical hernia (K42.9)          Asymptomatic.  Discussed the possible development of symptoms and need for surgery.         Ordered:          30911 office o/p est mod 30-39 min (Charge), Quantity: 1, Family history of pancreatic cancer  Microscopic Colitis  Irregular menses  Breast cancer  RLQ abdominal pain  Cyst of pancreas  Umbilical hernia          Referral (Request), 07/08/21 7:30:00 CDT, Referred to: Gastroenterology, Referred to: [facility], Family history of pancreatic cancer  Microscopic Colitis  RLQ abdominal pain  Umbilical hernia           Patient Information     Name:ABIGAIL SWARTZ      Address:      [address] [place] [id number]     Sex:Female     YOB: 1965     [phone]     Emergency Contact:CASSIUSHAYLEY RHYS     MRN:[id number]     FIN:[id number]     Location:[facility]     Date of Service:07/08/2021      Primary Care Physician:       Anshul Armendariz MD, [phone]      Attending Physician:       Delbert Ayala MD, [phone]  Problem List/Past Medical History    Ongoing     Acne     ASCUS on PAP Smear     Breast cancer     Cyst of pancreas     Family history of pancreatic cancer     Microscopic Colitis     Mild Depression     Raynaud's disease     Umbilical hernia    Historical     Mononucleosis       Comments: Hospitalization in high school and repeat bout in  college.     Pregnancy     Ruptured Ovarian Cyst     Tobacco abuse       Comments: Quit  Procedure/Surgical History      section (2009)      Comments: G1, P1.     Colonoscopy (2007)     Breast surgery     Extraction of wisdom tooth     Tonsillectomy  Medications    Calcium 600+D, Oral, bid    Co Q-10, 100 mg, Oral, daily    Fish Oil, Oral    Multiple Vitamins oral tablet, Oral, daily  Allergies    Keflex (pain in left kidney area)    Macrobid (Rash)    metals    sulfa drug (Rash)  Social History    Smoking Status     Never smoker     Alcohol      Current, 1-2 times per month, 2 drinks/episode average.     Electronic Cigarette/Vaping      Electronic Cigarette Use: Never.     Employment/School      Employed, Work/School description: .     Exercise      Exercise frequency: 3-6 times per week. Exercise type: Bicycling, Running, Walking, Weight lifting.     Home/Environment      Marital status: . Spouse/Partner name: Kristopher Osborne. 1 children.     Sexual      Sexually active: Yes. Sexual orientation: Heterosexual. Other contraceptive use: OCP.     Substance Abuse      Never     Tobacco      Never (less than 100 in lifetime)  Family History    Breast Cancer: Sister and Aunt (P).    Cancer of bowel: Grandmother (M).    Diabetes mellitus: Grandfather (M).    Pancreatic cancer: Mother, Father and Aunt (P).    Son: History is negative    Brother: History is negative    Brother: History is negative  Immunizations          Other Immunizations          Administration Dosage Date(s)          Hep B          1997, 1998, 1998          MMR (measles/mumps/rubella)          2003          influenza virus vaccine, inactivated          2015          Td          1983, 1994, 2004          tetanus/diphth/pertuss (Tdap) adult/adol          2015  Diagnostic Results     CT CT abdomen and pelvis revealed foreign 7 m cysts in the pancreas.  Tiny left  liver cyst.  Fat-containing umbilical hernia.  Otherwise negative.    Lab work from June 25 revealed a normal basic metabolic profile, CBC, UA, liver panel.  Negative serum pregnancy test.

## 2022-02-15 NOTE — PROGRESS NOTES
Patient:   ABIGAIL SWARTZ            MRN: [id number]            FIN: [id number]               Age:   55 years     Sex:  Female     :  1965   Associated Diagnoses:   Acute UTI   Author:   Mark Aviles MD      Visit Information      Date of Service: 2021 02:19 pm  Performing Location: [facility]  Encounter#: [id number]      Primary Care Provider (PCP):  Anshul Armendariz MD    NPI# [id number]      Referring Provider:  Mark Aviles MD    NPI# [id number]      Chief Complaint   2021 2:29 PM CDT    Painful urination, blood in urine, abdominal pain.  Bodyaches, chills.  Multiple visits in the last month.  Taking Azo, cranberry, increased fluids.        History of Present Illness   Patient has worsening UTI symptoms.  She now has some chills she feels achy she has frequency urgency dysuria hematuria her culture is pending she had 5-10 white cells in urine a few days ago.  No actual fever that she knows of some lower abdominal discomfort         Review of Systems   Constitutional:  Negative except as documented in history of present illness.    Respiratory:  Negative.    Cardiovascular:  Negative.    Gastrointestinal:  Negative.    Genitourinary:  Negative except as documented in history of present illness.    Musculoskeletal:  Negative.    Neurologic:  Negative.       Health Status   Allergies:    Allergic Reactions (Selected)  Severity Not Documented  Keflex (Pain in left kidney area)  Macrobid (Rash)  Metals (No reactions were documented)  Sulfa drug (Rash)   Medications:  (Selected)   Prescriptions  Prescribed  Levaquin 500 mg oral tablet: = 1 tab(s) ( 500 mg ), Oral, q 24 hrs, x 7 day(s), # 7 tab(s), 0 Refill(s), Type: Acute, Pharmacy: [id number], 1 tab(s) Oral q 24 hrs,x7 day(s), 65, in, 21 14:29:00 CDT, Height Measured, 155.6, lb, 21 14:29:00 CDT, Weight...  Documented Medications  Documented  Calcium 600+D: Oral, bid, 0 Refill(s), Type:  Maintenance  Co Q-10: ( 100 mg ), Oral, daily, 0 Refill(s), Type: Maintenance  Fish Oil: Oral, 0 Refill(s), Type: Maintenance  Multiple Vitamins oral tablet: Oral, daily, 0 Refill(s), Type: Maintenance   Problem list:    All Problems (Selected)  ASCUS on PAP Smear / ICD-9-.01 / Confirmed  Acne / ICD-9-.1 / Confirmed  Microscopic Colitis / ICD-9-.9 / Confirmed  Cyst of pancreas / SNOMED CT 18742532 / Confirmed  Breast cancer / SNOMED CT 450603726 / Confirmed  Mild Depression / ICD-9-.21 / Confirmed  Raynaud's disease / SNOMED CT 4844163392 / Confirmed  Family history of pancreatic cancer / SNOMED CT 1905655145 / Confirmed  Umbilical hernia / SNOMED CT 9026092954 / Confirmed      Histories   Past Medical History:    Active  ASCUS on PAP Smear (795.01): Onset in the month of 2008 at 42 years  Acne (706.1)  Microscopic Colitis (558.9)  Mild Depression (296.21)  Raynaud's disease (5277996040)  Breast cancer (640017792)  Resolved  Ruptured Ovarian Cyst (620.2): Onset in  at 22 years.  Resolved.  Tobacco abuse (496658742):  Resolved in  at 32 years.  Comments:  3/17/2010 CDT 4:15 PM CDT - Rubi Rosado CMA  Mononucleosis (075):  Resolved.  Comments:  3/17/2011 CDT 7:17 AM CDT - Zahra Jasmine  Hospitalization in high school and repeat bout in college.  Pregnancy (849095359):  Resolved on 2009 at 43 years.   Family History:    Cancer of bowel  Grandmother (M)  Diabetes mellitus  Grandfather (M)  Pancreatic cancer  Father  Mother ()  Aunt (P)  Breast Cancer  Sister  Aunt (P)     Procedure history:     section (SNOMED CT 15070480) in the month of 2009 at 43 Years.  Comments:  3/17/2011 7:24 AM CDT - Zahra Jasmine  G1, P1  Colonoscopy (SNOMED CT 438324798) on 2007 at 41 Years.  Tonsillectomy (SNOMED CT 849276938).  Extraction of wisdom tooth (SNOMED CT 252348864).  Breast surgery.   Social History:        Electronic Cigarette/Vaping Assessment             Electronic Cigarette Use: Never.      Alcohol Assessment            Current, 1-2 times per month, 2 drinks/episode average.      Tobacco Assessment            Never (less than 100 in lifetime)      Substance Abuse Assessment            Never      Employment and Education Assessment            Employed, Work/School description: .      Home and Environment Assessment            Marital status: .  Spouse/Partner name: Kristopher Osborne.  1 children.      Exercise and Physical Activity Assessment            Exercise frequency: 3-6 times per week.  Exercise type: Bicycling, Running, Walking, Weight lifting.      Sexual Assessment            Sexually active: Yes.  Sexual orientation: Heterosexual.  Other contraceptive use: OCP.        Physical Examination   Measurements from flowsheet : Measurements   7/30/2021 2:29 PM CDT Height Measured - Standard 65 in    Weight Measured - Standard 155.6 lb    BSA 1.8 m2    Body Mass Index 25.89 kg/m2  HI      General:  Alert and oriented, No acute distress.    Neck:  Supple.    Respiratory:  Respirations are non-labored.    Gastrointestinal:  Soft, Non-tender.    Genitourinary:  No costovertebral angle tenderness.    Neurologic:  Alert, Oriented.       Impression and Plan   Diagnosis     Acute UTI (TDT88-PZ N39.0).     Plan:  Patient with acute UTI culture pending we will treat with Levaquin follow-up in a few days if not improving sooner if worse warned of side effects  .

## 2022-02-15 NOTE — PROGRESS NOTES
Patient:   ABIGAIL SWARTZ            MRN: [id number]            FIN: [id number]               Age:   53 years     Sex:  Female     :  1965   Associated Diagnoses:   Hx of bilateral breast implants; Hx of breast cancer; Mastalgia   Author:   Anshul Armendariz MD      Visit Information      Date of Service: 2019 05:38 pm  Performing Location: [facility]  Encounter#: [id number]      Primary Care Provider (PCP):  Anshul Armendariz MD    NPI# [id number]      Referring Provider:  Anshul Armendariz MD    NPI# [id number]      Chief Complaint   2019 5:44 PM CDT    Right breast pain x 1-2 mos- s/p BL mastectomy 4 years ago with reconstructive surgery in 2015.  Had one episode of exteme dizziness 1 wk ago              Additional Information:No additional information recorded during visit.   Chief complaint and symptoms as noted above and confirmed with patient.  Recent lab and diagnostic studies reviewed with patient      History of Present Illness   2019: Abigail presents with 2-month history of right upper breast and chest pain radiating toward her shoulder and neck.  This has been a rather persistent finding.  Initially thought this may be musculoskeletal though does not seem to be resolving.  She has history of bilateral breast implant placement 5 years ago due to bilateral mastectomy for breast cancer.  Does not remember similar symptoms in any previous time.  No history of radiation exposure.          Review of Systems   Constitutional:  No fever, No chills.    Eye:  Negative except as documented in history of present illness.    Ear/Nose/Mouth/Throat:  Negative except as documented in history of present illness.    Respiratory:  No shortness of breath.    Cardiovascular:  No chest pain, No palpitations, No peripheral edema, No syncope.    Breast:       Nipple discharge: None.    Gastrointestinal:  No nausea, No vomiting, No abdominal pain.    Genitourinary:  No dysuria, No hematuria.     Hematology/Lymphatics:  Negative except as documented in history of present illness.    Endocrine   Immunologic:  No recurrent fevers.    Musculoskeletal:  No joint pain, No muscle pain.    Neurologic:  Alert and oriented X4.       Health Status   Allergies:    Allergic Reactions (Selected)  Severity Not Documented  Keflex (Pain in left kidney area)  Macrobid (Rash)  Metals (No reactions were documented)  Sulfa drug (Rash)   Medications:  (Selected)   Documented Medications  Documented  Calcium 600+D: Oral, bid, 0 Refill(s), Type: Maintenance  Fish Oil: Oral, 0 Refill(s), Type: Maintenance  Multiple Vitamins oral tablet: Oral, daily, 0 Refill(s), Type: Maintenance  tamoxifen 20 mg oral tablet: 0 Refill(s), Type: Soft Stop,    Medications          *denotes recorded medication          *Calcium 600+D: Oral, bid, 0 Refill(s).          *Multiple Vitamins oral tablet: Oral, daily, 0 Refill(s).          *Fish Oil: Oral, 0 Refill(s).          *tamoxifen 20 mg oral tablet: 0 Refill(s).       Problem list:    All Problems  Acne / ICD-9-.1 / Confirmed  ASCUS on PAP Smear / ICD-9-.01 / Confirmed  Breast cancer / SNOMED CT 669608203 / Confirmed  Microscopic Colitis / ICD-9-.9 / Confirmed  Mild Depression / ICD-9-.21 / Confirmed  Raynaud's disease / SNOMED CT 4323515683 / Confirmed  Resolved: Mononucleosis / ICD-9-  Resolved: Pregnancy / SNOMED CT 967924849  Resolved: Ruptured Ovarian Cyst / ICD-9-.2  Resolved: Tobacco abuse / SNOMED CT 351315835      Histories   Past Medical History:    Active  ASCUS on PAP Smear (795.01): Onset in the month of 7/2008 at 42 years  Acne (706.1)  Microscopic Colitis (558.9)  Mild Depression (296.21)  Raynaud's disease (8755299508)  Breast cancer (888492658)  Resolved  Ruptured Ovarian Cyst (620.2): Onset in 1988 at 22 years.  Resolved.  Tobacco abuse (389144609):  Resolved in 1998 at 32 years.  Comments:  3/17/2010 CDT 4:15 PM CDT - Ashlee CUEVAS,  Rubi  Luis  Mononucleosis (075):  Resolved.  Comments:  3/17/2011 CDT 7:17 AM CDT - Zahra Jasmine  Hospitalization in high school and repeat bout in college.  Pregnancy (145742438):  Resolved on 2009 at 43 years.   Family History:    Cancer of bowel  Grandmother (M)  Diabetes mellitus  Grandfather (M)     Procedure history:     section (49362083) in the month of 2009 at 43 Years.  Comments:  3/17/2011 7:24 AM CDT - Zahra Jasmine  G1, P1  Colonoscopy (589824679) on 2007 at 41 Years.  Tonsillectomy (323483452).  Extraction of wisdom tooth (062749277).  Breast surgery.   Social History:        Alcohol Assessment            Current, 1-2 times per month, 2 drinks/episode average.      Tobacco Assessment            Never      Substance Abuse Assessment            Never      Employment and Education Assessment            Employed, Work/School description: .      Home and Environment Assessment            Marital status: .  Spouse/Partner name: Kristopher Osborne.  1 children.      Exercise and Physical Activity Assessment            Exercise frequency: 3-6 times per week.  Exercise type: Bicycling, Running, Walking, Weight lifting.      Sexual Assessment            Sexually active: Yes.  Sexual orientation: Heterosexual.  Other contraceptive use: OCP.        Physical Examination   vital signs stable, as noted above   Vital Signs   2019 5:44 PM CDT Temperature Tympanic 98.9 DegF    Peripheral Pulse Rate 60 bpm    Systolic Blood Pressure 90 mmHg    Diastolic Blood Pressure 68 mmHg    Mean Arterial Pressure 75 mmHg      Measurements from flowsheet : Measurements   2019 5:44 PM CDT    Weight Measured - Standard                147 lb     General:  Alert and oriented, No acute distress.    Eye:  Extraocular movements are intact.    HENT:  Normocephalic, Oral mucosa is moist.    Neck:  Supple, No carotid bruit, No jugular venous distention, No lymphadenopathy.    Respiratory:  Lungs  are clear to auscultation, Respirations are non-labored.    Cardiovascular:  Normal rate, Regular rhythm, No murmur, No edema.    Breast:  bilateral implants; no assymmetry. No axillary masses. No skin abnormalities No reproducible pains.    Neurologic:  Alert, Oriented, Normal motor function, No focal deficits.    Cognition and Speech:  Oriented, Speech clear and coherent.    Psychiatric:  Appropriate mood & affect.       Impression and Plan   Diagnosis     Hx of bilateral breast implants (YTA61-DV Z98.82).     Hx of breast cancer (SZU73-MC Z85.3).     Mastalgia (AFO11-LQ N64.4).       .) right breast pains, >2 months with remote history of bilateral silicone breast implants   - originally placed by Dr. James in 7/2015   - no XRT hx   - will reach out to Lluvia Lindquist as best means of follow-up, anticipate her being evaluated at [facility] breast center    - defer any imaging decisions to breast center

## 2022-02-15 NOTE — NURSING NOTE
Comprehensive Intake Entered On:  12/30/2019 2:07 PM CST    Performed On:  12/30/2019 2:03 PM CST by Constance Miller CMA               Summary   Chief Complaint :   Rash on R hip and buttocks - noticed about a week ago. Initially thought bug bites. Some burning and itching.    Weight Measured :   149 lb(Converted to: 149 lb 0 oz, 67.59 kg)    Systolic Blood Pressure :   94 mmHg   Diastolic Blood Pressure :   56 mmHg (LOW)    Mean Arterial Pressure :   69 mmHg   Peripheral Pulse Rate :   68 bpm   BP Site :   Right arm   Pulse Site :   Radial artery   BP Method :   Manual   HR Method :   Manual   Temperature Tympanic :   97.7 DegF(Converted to: 36.5 DegC)  (LOW)    Languages :   English   Constance Miller CMA - 12/30/2019 2:03 PM CST   Health Status   Allergies Verified? :   Yes   Medication History Verified? :   Yes   Immunizations Current :   Yes   Pre-Visit Planning Status :   Not completed   Constance Miller CMA - 12/30/2019 2:03 PM CST   Meds / Allergies   (As Of: 12/30/2019 2:07:18 PM CST)   Allergies (Active)   Keflex  Estimated Onset Date:   Unspecified ; Reactions:   pain in left kidney area ; Created By:   Rubi Rosado CMA; Reaction Status:   Active ; Substance:   Keflex ; Type:   Allergy ; Updated By:   Rubi Rosado CMA; Source:   Paper Chart ; Reviewed Date:   3/18/2019 4:43 PM CDT      Macrobid  Estimated Onset Date:   Unspecified ; Reactions:   Rash ; Created By:   uRbi Rosado CMA; Reaction Status:   Active ; Substance:   Macrobid ; Type:   Allergy ; Updated By:   Rubi Rosado CMA; Source:   Paper Chart ; Reviewed Date:   3/18/2019 4:43 PM CDT      metals  Estimated Onset Date:   Unspecified ; Created By:   Rose Moncada CMA; Reaction Status:   Active ; Category:   Other ; Substance:   metals ; Type:   Allergy ; Updated By:   Rose Moncada CMA; Reviewed Date:   3/18/2019 4:43 PM CDT      sulfa drug  Estimated Onset Date:   Unspecified ; Reactions:   Rash ; Created By:    Rubi Rosado CMA; Reaction Status:   Active ; Category:   Drug ; Substance:   sulfa drug ; Type:   Allergy ; Updated By:   Rubi Rosado CMA; Source:   Paper Chart ; Reviewed Date:   3/18/2019 4:43 PM CDT        Medication List   (As Of: 12/30/2019 2:07:18 PM CST)   Home Meds    calcium-vitamin D  :   calcium-vitamin D ; Status:   Documented ; Ordered As Mnemonic:   Calcium 600+D ; Simple Display Line:   Oral, bid, 0 Refill(s) ; Catalog Code:   calcium-vitamin D ; Order Dt/Tm:   9/19/2019 5:44:04 PM CDT          multivitamin  :   multivitamin ; Status:   Documented ; Ordered As Mnemonic:   Multiple Vitamins oral tablet ; Simple Display Line:   Oral, daily, 0 Refill(s) ; Catalog Code:   multivitamin ; Order Dt/Tm:   9/19/2019 5:43:41 PM CDT          omega-3 polyunsaturated fatty acids  :   omega-3 polyunsaturated fatty acids ; Status:   Documented ; Ordered As Mnemonic:   Fish Oil ; Simple Display Line:   Oral, 0 Refill(s) ; Catalog Code:   omega-3 polyunsaturated fatty acids ; Order Dt/Tm:   3/18/2019 4:42:43 PM CDT          tamoxifen  :   tamoxifen ; Status:   Documented ; Ordered As Mnemonic:   tamoxifen 20 mg oral tablet ; Simple Display Line:   0 Refill(s) ; Catalog Code:   tamoxifen ; Order Dt/Tm:   9/19/2019 5:44:28 PM CDT ; Comment:   Responsible Provider: OSIRIS ARGUETA

## 2022-02-15 NOTE — NURSING NOTE
Comprehensive Intake Entered On:  3/4/2020 6:07 PM CST    Performed On:  3/4/2020 6:01 PM CST by Shital Gillette CMA               Summary   Chief Complaint :   c/o right elbow pain since Nov having a hard time gripping objects, left wrist pain    Weight Measured :   150.6 lb(Converted to: 150 lb 10 oz, 68.31 kg)    Height Measured :   65 in(Converted to: 5 ft 5 in, 165.10 cm)    Body Mass Index :   25.06 kg/m2 (HI)    Body Surface Area :   1.77 m2   Systolic Blood Pressure :   120 mmHg   Diastolic Blood Pressure :   60 mmHg   Mean Arterial Pressure :   80 mmHg   Peripheral Pulse Rate :   90 bpm   BP Site :   Right arm   Pulse Site :   Radial artery   BP Method :   Manual   HR Method :   Manual   Temperature Tympanic :   97.6 DegF(Converted to: 36.4 DegC)  (LOW)    Oxygen Saturation :   97 %   Languages :   English   Shital Gillette CMA - 3/4/2020 6:01 PM CST   Health Status   Allergies Verified? :   Yes   Medication History Verified? :   Yes   Immunizations Current :   Yes   Medical History Verified? :   No   Pre-Visit Planning Status :   Not completed   Tobacco Use? :   Never smoker   Shital Gillette CMA - 3/4/2020 6:01 PM CST   Consents   Consent for Immunization Exchange :   Consent Granted   Consent for Immunizations to Providers :   Consent Granted   Shital Gillette CMA - 3/4/2020 6:01 PM CST   Meds / Allergies   (As Of: 3/4/2020 6:07:16 PM CST)   Allergies (Active)   Keflex  Estimated Onset Date:   Unspecified ; Reactions:   pain in left kidney area ; Created By:   Rubi Rosado CMA; Reaction Status:   Active ; Substance:   Keflex ; Type:   Allergy ; Updated By:   Rubi Rosado CMA; Source:   Paper Chart ; Reviewed Date:   3/4/2020 6:05 PM CST      Macrobid  Estimated Onset Date:   Unspecified ; Reactions:   Rash ; Created By:   Rubi Rosado CMA; Reaction Status:   Active ; Substance:   Macrobid ; Type:   Allergy ; Updated By:   Rubi Rosado CMA; Source:   Paper Chart ; Reviewed  Date:   3/4/2020 6:05 PM CST      metals  Estimated Onset Date:   Unspecified ; Created By:   Rose Moncada CMA; Reaction Status:   Active ; Category:   Other ; Substance:   metals ; Type:   Allergy ; Updated By:   Rose Moncada CMA; Reviewed Date:   3/4/2020 6:05 PM CST      sulfa drug  Estimated Onset Date:   Unspecified ; Reactions:   Rash ; Created By:   Rubi Rosado CMA; Reaction Status:   Active ; Category:   Drug ; Substance:   sulfa drug ; Type:   Allergy ; Updated By:   Rubi Rosado CMA; Source:   Paper Chart ; Reviewed Date:   3/4/2020 6:05 PM CST        Medication List   (As Of: 3/4/2020 6:07:16 PM CST)   Home Meds    calcium-vitamin D  :   calcium-vitamin D ; Status:   Documented ; Ordered As Mnemonic:   Calcium 600+D ; Simple Display Line:   Oral, bid, 0 Refill(s) ; Catalog Code:   calcium-vitamin D ; Order Dt/Tm:   9/19/2019 5:44:04 PM CDT          multivitamin  :   multivitamin ; Status:   Documented ; Ordered As Mnemonic:   Multiple Vitamins oral tablet ; Simple Display Line:   Oral, daily, 0 Refill(s) ; Catalog Code:   multivitamin ; Order Dt/Tm:   9/19/2019 5:43:41 PM CDT          omega-3 polyunsaturated fatty acids  :   omega-3 polyunsaturated fatty acids ; Status:   Documented ; Ordered As Mnemonic:   Fish Oil ; Simple Display Line:   Oral, 0 Refill(s) ; Catalog Code:   omega-3 polyunsaturated fatty acids ; Order Dt/Tm:   3/18/2019 4:42:43 PM CDT          tamoxifen  :   tamoxifen ; Status:   Documented ; Ordered As Mnemonic:   tamoxifen 20 mg oral tablet ; Simple Display Line:   0 Refill(s) ; Catalog Code:   tamoxifen ; Order Dt/Tm:   9/19/2019 5:44:28 PM CDT ; Comment:   Responsible Provider: OSIRIS ARGUETA

## 2022-02-15 NOTE — PROGRESS NOTES
Chief Complaint    Verbal consent given for video visit. Received second Moderna COVID vaccine about 2 weeks ago and has had chronic fatigue since. No rxn to first injection. Has hx of fatigue in past. Never tested positive to COVID.  History of Present Illness       Today's visit was conducted via video conferencing due to the COVID-19 pandemic.       Patient's consent to video visit was obtained and documented.       Video call start time: 12:27pm       Video call end time: 12:44pm       Location of patient: at work       Location of physician: clinic, [place], [place]       Patient is a 55-year-old female with concerns about reaction to edema Daranide vaccine.       She received her second dose of the [place] vaccine on January 11.  She had no reaction to the first dose at all.  She has no known history of coronavirus disease.       After the second dose she has been experiencing low-grade temperatures, headache, sore throat, muscle aches and fatigue.  Sometimes she will feel okay with her energy and then all of a sudden fatigue will hit her so hard.       She reports a history of chronic fatigue syndrome symptoms.  She has had multiple work-ups in the past but nothing has been found.       She is currently taking a multivitamin, fish oil, calcium with vitamin D.       Her past medical history includes microscopic colitis, Raynaud's disease.  She has had a history of mononucleosis infection.  History of breast cancer.  Review of Systems       Negative except as listed in HPI          Physical Exam       Video visit.       She is alert, oriented, and in no acute distress.          Assessment/Plan       Fatigue (R53.83)         Ordered:          55438 office o/p est low 20-29 min (Charge), Quantity: 1, Fatigue  Vaccine reaction                Vaccine reaction (T50.Z95A)         Ordered:          77816 office o/p est low 20-29 min (Charge), Quantity: 1, Fatigue  Vaccine reaction                She understands that  she cannot get coronavirus disease from the vaccine.  She just knows that she has been feeling these chronic fatigue symptoms since receiving the vaccine.  I discussed with her that there are some supplements that could help her body recover.  She reports that in addition to the supplements listed above she has also been taking KELBY to help her sleep recently.       I recommended that she take:       Co-Q10 100 mg daily       Vitamin D 4000 to 5000 international units daily       N-acetylcysteine (NAC) 500 mg daily       EPA/DHA 1 g 3 times a day       Sanchez human 500 mg 1-2 times daily       Multivitamin daily       She also may consider adding l-carnitine       I would like her to follow-up with me to let me know how she is doing in a couple of weeks.       Also discussed with her that there is good information on the [employer] [facility] website regarding coronavirus       video: 17min       chartinmin       note revised to add diagnosis with codes and time spent documentation  Patient Information     Name:ABIGAIL SWARTZ      Address:      [address] [place] [phone]     Sex:Female     YOB: 1965     [phone]     Emergency Contact:HAYLEY HAMMONDS     MRN:[id number]     FIN:[id number]     Location:[facility]     Date of Service:2021      Primary Care Physician:       Anshul Armendariz MD, [phone]      Attending Physician:       Ally Higginbotham MD, [phone]  Problem List/Past Medical History    Ongoing     Acne     ASCUS on PAP Smear     Breast cancer     Microscopic Colitis     Mild Depression     Raynaud's disease    Historical     Mononucleosis       Comments: Hospitalization in high school and repeat bout in college.     Pregnancy     Ruptured Ovarian Cyst     Tobacco abuse       Comments: Quit  Procedure/Surgical History      section (2009)            Comments: G1, P1.     Colonoscopy (2007)           Breast  surgery           Extraction of wisdom tooth           Tonsillectomy        Medications    Calcium 600+D, Oral, bid    Fish Oil, Oral    Multiple Vitamins oral tablet, Oral, daily  Allergies    Keflex (pain in left kidney area)    Macrobid (Rash)    metals    sulfa drug (Rash)  Social History    Smoking Status     Never smoker     Alcohol      Current, 1-2 times per month, 2 drinks/episode average.     Electronic Cigarette/Vaping      Electronic Cigarette Use: Never.     Employment/School      Employed, Work/School description: .     Exercise      Exercise frequency: 3-6 times per week. Exercise type: Bicycling, Running, Walking, Weight lifting.     Home/Environment      Marital status: . Spouse/Partner name: Kristopher Osborne. 1 children.     Sexual      Sexually active: Yes. Sexual orientation: Heterosexual. Other contraceptive use: OCP.     Substance Abuse      Never     Tobacco      Never (less than 100 in lifetime)  Family History    Cancer of bowel: Grandmother (M).    Diabetes mellitus: Grandfather (M).    Son: History is negative    Father: History is negative    Sister: History is negative    Brother: History is negative    Brother: History is negative  Immunizations      Vaccine Date Status          influenza virus vaccine, inactivated 02/06/2015 Given          tetanus/diphth/pertuss (Tdap) adult/adol 02/06/2015 Given          Td 12/01/2004 Recorded          MMR (measles/mumps/rubella) 12/04/2003 Recorded          Hep B 06/01/1998 Recorded          Hep B 01/01/1998 Recorded          Hep B 12/01/1997 Recorded          Td 11/01/1994 Recorded          Td 01/01/1983 Recorded

## 2022-02-15 NOTE — NURSING NOTE
Comprehensive Intake Entered On:  7/9/2020 11:32 AM CDT    Performed On:  7/9/2020 11:31 AM CDT by Rubi Rosado CMA               Summary   Chief Complaint :   f/u ongoing cough.  has improved but is still productive with a wheeze. Worse when she talks , laughs or takes a deep breath. Has not been using inhaler much. Verbal consent given for video visit   Height Measured :   65 in(Converted to: 5 ft 5 in, 165.10 cm)    Languages :   English   Rubi Rosado CMA - 7/9/2020 11:31 AM CDT   Health Status   Allergies Verified? :   Yes   Medication History Verified? :   Yes   Immunizations Current :   Yes   Pre-Visit Planning Status :   Completed   Tobacco Use? :   Never smoker   Rubi Rosado CMA - 7/9/2020 11:31 AM CDT   Consents   Consent for Immunization Exchange :   Consent Granted   Consent for Immunizations to Providers :   Consent Granted   Rubi Rosado CMA - 7/9/2020 11:31 AM CDT   Meds / Allergies   (As Of: 7/9/2020 11:32:56 AM CDT)   Allergies (Active)   Keflex  Estimated Onset Date:   Unspecified ; Reactions:   pain in left kidney area ; Created By:   Rubi Larsen; Reaction Status:   Active ; Substance:   Keflex ; Type:   Allergy ; Updated By:   Rubi Larsen; Source:   Paper Chart ; Reviewed Date:   6/24/2020 5:07 PM CDT      Macrobid  Estimated Onset Date:   Unspecified ; Reactions:   Rash ; Created By:   Rubi Larsen; Reaction Status:   Active ; Substance:   Macrobid ; Type:   Allergy ; Updated By:   Rubi Larsen; Source:   Paper Chart ; Reviewed Date:   6/24/2020 5:07 PM CDT      metals  Estimated Onset Date:   Unspecified ; Created By:   oRse Moncada CMA; Reaction Status:   Active ; Category:   Other ; Substance:   metals ; Type:   Allergy ; Updated By:   Rose Moncada CMA; Reviewed Date:   6/24/2020 5:07 PM CDT      sulfa drug  Estimated Onset Date:   Unspecified ; Reactions:   Rash ; Created By:   Rubi Larsen; Reaction  Status:   Active ; Category:   Drug ; Substance:   sulfa drug ; Type:   Allergy ; Updated By:   Rubi Larsen; Source:   Paper Chart ; Reviewed Date:   6/24/2020 5:07 PM CDT        Medication List   (As Of: 7/9/2020 11:32:56 AM CDT)   Prescription/Discharge Order    albuterol  :   albuterol ; Status:   Prescribed ; Ordered As Mnemonic:   albuterol 90 mcg/inh inhalation aerosol ; Simple Display Line:   2 puff(s), Inhale, qid, PRN: for wheezing, 17 gm, 1 Refill(s) ; Ordering Provider:   Ally Higginbotham MD; Catalog Code:   albuterol ; Order Dt/Tm:   6/27/2020 11:46:46 AM CDT            Home Meds    calcium-vitamin D  :   calcium-vitamin D ; Status:   Documented ; Ordered As Mnemonic:   Calcium 600+D ; Simple Display Line:   Oral, bid, 0 Refill(s) ; Catalog Code:   calcium-vitamin D ; Order Dt/Tm:   9/19/2019 5:44:04 PM CDT          multivitamin  :   multivitamin ; Status:   Documented ; Ordered As Mnemonic:   Multiple Vitamins oral tablet ; Simple Display Line:   Oral, daily, 0 Refill(s) ; Catalog Code:   multivitamin ; Order Dt/Tm:   9/19/2019 5:43:41 PM CDT          omega-3 polyunsaturated fatty acids  :   omega-3 polyunsaturated fatty acids ; Status:   Documented ; Ordered As Mnemonic:   Fish Oil ; Simple Display Line:   Oral, 0 Refill(s) ; Catalog Code:   omega-3 polyunsaturated fatty acids ; Order Dt/Tm:   3/18/2019 4:42:43 PM CDT            ID Risk Screen   Recent Travel History :   Last travel within 7 days   Family Member Travel History :   No recent travel   Other Exposure to Infectious Disease :   Unknown   Rubi Rosado CMA - 7/9/2020 11:31 AM CDT

## 2022-02-15 NOTE — PROGRESS NOTES
Patient:   ABIGAIL SWARTZ            MRN: [id number]            FIN: [id number]               Age:   54 years     Sex:  Female     :  1965   Associated Diagnoses:   Cough   Author:   Melvin Rdz MD      Visit Information      Date of Service: 2020 12:26 pm  Performing Location: [facility]  Encounter#: [id number]      Primary Care Provider (PCP):  Anshul Armendariz MD    NPI# [id number]      Referring Provider:  Melvin Rdz MD    NPI# [id number]   Visit type:  Video Visit via Dox[employer].    Participants in room during visit:  Patient   Location of patient:  Home  Location of provider:  Clinic  Video Start Time:  1723  Video End Time:   1730    Today's visit was conducted via video conference due to the COVID-19 pandemic.  The patient's consent to proceed with a video visit has been obtained and documented.      Chief Complaint   2020 5:04 PM CDT    c/o ongoing cough, throat irritation, fatigue for the past week COVID test negative last Wednesday  verbal consent given for video visit        History of Present Illness   Patient is a 54 year old female who is being evaluated via a billable video visit.    Developed a cough last week. Son had a cough as well and improved following an antibiotic. Works at [employer] and tested negative for Coronavirus last week. Feeling a little better early in the week but today the cough has returned with laryngitis. 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      Review of Systems   Constitutional:  No fever.    Gastrointestinal:  No vomiting.    No loss of taste and smell  Has mild headache  No shortness of breath    No history of blood clots      Health Status   Allergies:    Allergic Reactions (Selected)  Severity Not Documented  Keflex (Pain in left kidney area)  Macrobid (Rash)  Metals (No reactions were documented)  Sulfa drug (Rash)   Medications:  (Selected)   Documented Medications  Documented  Calcium  600+D: Oral, bid, 0 Refill(s), Type: Maintenance  Fish Oil: Oral, 0 Refill(s), Type: Maintenance  Multiple Vitamins oral tablet: Oral, daily, 0 Refill(s), Type: Maintenance  tamoxifen 20 mg oral tablet: 0 Refill(s), Type: Soft Stop   Problem list:    All Problems  Acne / ICD-9-.1 / Confirmed  ASCUS on PAP Smear / ICD-9-.01 / Confirmed  Breast cancer / SNOMED CT 956061767 / Confirmed  Microscopic Colitis / ICD-9-.9 / Confirmed  Mild Depression / ICD-9-.21 / Confirmed  Raynaud's disease / SNOMED CT 1380297854 / Confirmed      Histories   Procedure history:     section (SNOMED CT 00714785) in the month of 2009 at 43 Years.  Comments:  3/17/2011 7:24 AM CDT - Zahra Jasmine  G1, P1  Colonoscopy (SNOMED CT 320885345) on 2007 at 41 Years.  Tonsillectomy (SNOMED CT 601038285).  Extraction of wisdom tooth (SNOMED CT 750702610).  Breast surgery.      Physical Examination   Measurements from flowsheet : Measurements   2020 5:04 PM CDT    Height Measured - Standard                65 in     General:  Alert and oriented, No acute distress.    Respiratory:  Respirations are non-labored.    Psychiatric:  Cooperative, Appropriate mood & affect, Normal judgment.       Impression and Plan   Diagnosis     Cough (LDY27-ZJ R05).     Will take zithromax and follow up in clinic if not improving

## 2022-02-15 NOTE — PROGRESS NOTES
Patient:   ABIGAIL SWARTZ            MRN: [id number]            FIN: [id number]               Age:   52 years     Sex:  Female     :  1965   Associated Diagnoses:   Left wrist tendinitis   Author:   Marizol Purdy      Visit Information      Date of Service: 2018 05:56 pm  Performing Location: [facility]  Encounter#: [id number]      Chief Complaint   3/1/2018 6:04 PM CST     Patient presents with L wrist pain x 1 year getting worse        History of Present Illness   confirmed chief complaint with patient  she has had left wrist pain intermit for a year  starts suddenly then difficulty using thumb and mostling internal flexed rotation  swelling  hasn't used ice or ibuprofen (hurts her stomach) or tylenol, no spling  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         Review of Systems             Health Status   Allergies:    Allergic Reactions (Selected)  Severity Not Documented  Keflex (Pain in left kidney area)  Macrobid (Rash)  Metals (No reactions were documented)  Sulfa drug (Rash)   Medications:  (Selected)   Documented Medications  Documented  melatonin 3 mg oral tablet: 1 tab(s) ( 3 mg ), po, hs, 0 Refill(s), Type: Maintenance  tamoxifen: po, daily, 0 Refill(s), Type: Maintenance   Problem list:    All Problems  Acne / ICD-9-.1 / Confirmed  Microscopic Colitis / ICD-9-.9 / Confirmed  Mild Depression / ICD-9-.21 / Confirmed  ASCUS on PAP Smear / ICD-9-.01 / Confirmed  Raynaud's disease / SNOMED CT 4186324878 / Confirmed  Breast cancer / SNOMED CT 687215070 / Confirmed  Resolved: Tobacco abuse / SNOMED CT 803327059  Quit  Resolved: Mononucleosis / ICD-9-  Hospitalization in high school and repeat bout in college.  Resolved: Ruptured Ovarian Cyst / ICD-9-.2  Resolved: Pregnancy / SNOMED CT 399596025      Histories   Past Medical History:    Active  ASCUS on PAP Smear (795.01): Onset in the month of 2008 at 42 years  Acne  (706.1)  Microscopic Colitis (558.9)  Mild Depression (296.21)  Raynaud's disease (4420467791)  Breast cancer (400806697)  Resolved  Ruptured Ovarian Cyst (620.2): Onset in  at 22 years.  Resolved.  Tobacco abuse (544753941):  Resolved in  at 32 years.  Comments:  3/17/2010 CDT 4:15 PM CDT - Ashlee CUEVASRubi  Luis  Mononucleosis (075):  Resolved.  Comments:  3/17/2011 CDT 7:17 AM CDT - Zahra Jasmine  Hospitalization in high school and repeat bout in college.  Pregnancy (833961733):  Resolved on 2009 at 43 years.   Family History:    Cancer of bowel  Grandmother (M)  Diabetes mellitus  Grandfather (M)     Procedure history:     section (SNOMED CT 47540199) in the month of 2009 at 43 Years.  Comments:  3/17/2011 7:24 AM - Zahra Jasmine  G1, P1  Colonoscopy (SNOMED CT 062056877) on 2007 at 41 Years.  Tonsillectomy (SNOMED CT 365253920).  Extraction of wisdom tooth (SNOMED CT 201290960).  Breast surgery.   Social History:        Alcohol Assessment            Current, 1-2 times per month, 2 drinks/episode average.      Tobacco Assessment            Never      Substance Abuse Assessment            Never      Employment and Education Assessment            Employed, Work/School description: .      Home and Environment Assessment            Marital status: .  Spouse/Partner name: Kristopher Osborne.  1 children.      Exercise and Physical Activity Assessment            Exercise frequency: 3-6 times per week.  Exercise type: Bicycling, Running, Walking, Weight lifting.      Sexual Assessment            Sexually active: Yes.  Sexual orientation: Heterosexual.  Other contraceptive use: OCP.        Physical Examination   Vital Signs   3/1/2018 6:04 PM CST Temperature Tympanic 98.8 DegF    Peripheral Pulse Rate 75 bpm    HR Method Electronic    Systolic Blood Pressure 110 mmHg    Diastolic Blood Pressure 64 mmHg    Mean Arterial Pressure 79 mmHg    BP Site Right arm    BP Method  Manual      Measurements from flowsheet : Measurements   3/1/2018 6:04 PM CST Height Measured - Standard 65 in    Weight Measured - Standard 151.4 lb    BSA 1.77 m2    Body Mass Index 25.19 kg/m2  HI      General:  Alert and oriented, No acute distress, left wrist is a bit swollen and warm and tender with flexion, also with movement of the thumb.  strong radial pulse and brachial pulse  no elbow tenderness.       Impression and Plan   Diagnosis     Left wrist tendinitis (GJE20-TW M77.8).     Patient Instructions:       Counseled: Patient, Regarding diagnosis, Regarding treatment, Regarding medications, Verbalized understanding, Counseled on symptomatic management. Return to clinic for re evaluation if worsening, simply not improving, or failure to resolve.   use ice 20 min on then off  use pain medication  use splint, especially at hs.

## 2022-02-15 NOTE — PROGRESS NOTES
Chief Complaint    Sinus infection  History of Present Illness      Chief complaint as above reviewed and confirmed with patient.  Pt presents to the clinic with concerns re: rhinorrhea, thick nasal discharge and pnd, congestion facial pain and pressure x 1 week. Has had prolonged uri over the last few months with wheezing and cough. Negative covid 19 test, treated with abx, albuterol, prednisone. Feels much better but occasional cough and wheezing.  Using albuterol infrequently.  NO fevers.   Review of Systems      Review of systems is negative with the exception of those noted in HPI   Physical Exam   Vitals & Measurements    T: 97.5  F (Tympanic)  HR: 75 (Peripheral)  BP: 116/64  SpO2: 98%     HT: 65 in  WT: 150 lb  BMI: 24.96           Vitals as above per nursing documentation           Constitutional : nad appears well          Ears: ears patent B, TMS intact, noninjected           Nose: nasal mucosa is ededmatous pale and boggy. mucopurulent discharge , TtP of the maxillary sinuses.           Throat: pharynx is nonerythematous, no tonsillar hypertrophy, no exudate           Neck: neck supple, no adenopathy, no thyromegaly, no rigidity           Lungs: lungs CTA', no Wheezes, rhonchi or rales           Heart: heart RRR, nl S1, S2 no murmur           skin:  No rashes              Assessment/Plan       1. Maxillary sinusitis (J32.0)        Augmentin as ordered.  nasal saline, OTC antihistamines for any allergy sx. push fluids.  Albuterol prn.       Orders:         amoxicillin-clavulanate, = 1 tab(s), Oral, q12 hrs, x 10 day(s), # 20 tab(s), 0 Refill(s), Type: Acute, [id number], 1 tab(s) Oral q12 hrs,x10 day(s), 65, in, 09/12/20 8:51:00 CDT, Height Measured, 150, lb, 09/12/20 8:51:00 CDT, Weight Measured, (Ordered)  Patient Information     Name:ADE HAMMONDS ABIGAIL E      Address:      [address] [place] [id number]     Sex:Female     YOB: 1965      [phone]     Emergency Contact:HAYLEY OSBORNE     MRN:[id number]     FIN:[id number]     Location:[facility]     Date of Service:2020      Primary Care Physician:       Anshul Armendariz MD, [phone]      Attending Physician:       Jazmine Jiménez PA-C, [phone]  Problem List/Past Medical History    Ongoing     Acne     ASCUS on PAP Smear     Breast cancer     Microscopic Colitis     Mild Depression     Raynaud's disease    Historical     Mononucleosis       Comments: Hospitalization in high school and repeat bout in college.     Pregnancy     Ruptured Ovarian Cyst     Tobacco abuse       Comments: Quit  Procedure/Surgical History      section (2009)      Comments: G1, P1.     Colonoscopy (2007)     Breast surgery     Extraction of wisdom tooth     Tonsillectomy  Medications    Augmentin 875 mg-125 mg oral tablet, 1 tab(s), Oral, q12 hrs    Calcium 600+D, Oral, bid    Fish Oil, Oral    Multiple Vitamins oral tablet, Oral, daily  Allergies    Keflex (pain in left kidney area)    Macrobid (Rash)    metals    sulfa drug (Rash)  Social History    Smoking Status     Never smoker     Alcohol      Current, 1-2 times per month, 2 drinks/episode average.     Employment/School      Employed, Work/School description: .     Exercise      Exercise frequency: 3-6 times per week. Exercise type: Bicycling, Running, Walking, Weight lifting.     Home/Environment      Marital status: . Spouse/Partner name: Kristopher Osborne. 1 children.     Sexual      Sexually active: Yes. Sexual orientation: Heterosexual. Other contraceptive use: OCP.     Substance Abuse      Never     Tobacco      Never  Family History    Cancer of bowel: Grandmother (M).    Diabetes mellitus: Grandfather (M).    Son: History is negative    Father: History is negative    Sister: History is negative    Brother: History is negative    Brother: History is negative  Immunizations      Vaccine Date  Status          influenza virus vaccine, inactivated 02/06/2015 Given          tetanus/diphth/pertuss (Tdap) adult/adol 02/06/2015 Given          Td 12/01/2004 Recorded          MMR (measles/mumps/rubella) 12/04/2003 Recorded          Hep B 06/01/1998 Recorded          Hep B 01/01/1998 Recorded          Hep B 12/01/1997 Recorded          Td 11/01/1994 Recorded          Td 01/01/1983 Recorded

## 2022-02-15 NOTE — PROGRESS NOTES
Chief Complaint    f/u ongoing cough.  has improved but is still productive with a wheeze. Worse when she talks , laughs or takes a deep breath. Has not been using inhaler much. Verbal consent given for video visit  History of Present Illness      52-year-old here with ongoing cough.  Is a video visit.  She is in her office at work.      Patient was diagnosed with upper respiratory infection and treated with azithromycin.  She eventually then had a course of prednisone and an albuterol inhaler.  The prednisone seemed to make a marked improvement but she is back 70% of normal but not at normal      She has no history of asthma or lung problems cough is dry to light phlegm cough.  Primarily occurs if she is more active or speaking quite a bit.  Review of Systems      See HPI.  All other review of systems negative.  Physical Exam   Vitals & Measurements    HT: 65 in       Alert and talkative there is an occasional dry cough but her breathing is nonlabored  Assessment/Plan       1. Cough (R05)         Post infectious bronchospasm.  She did have negative Malachi testing.  Her son and  had similar illnesses similar positive for the coronavirus.  She has improved but not to where she needs to be.  We will do a prednisone 20 mg for 7 days and then 10 mg for 7 days.  If that does not resolve she be a good candidate to try product such as Advair and obtain a chest x-ray       Orders:         predniSONE, See Instructions, Instructions: 2 tab(s) PO Daily 7 day(s) then 1 tab po daily for next 7 days, # 21 tab(s), 0 Refill(s), Type: Maintenance, [id number], 2 tab(s) PO Daily 7 day(s) then 1 tab po daily for next 7 days, 65, in,..., (Ordered)  Patient Information     Name:BOOKER ABIGAIL HAMMONDS      Address:      [address] [place] [id number]     Sex:Female     YOB: 1965     [phone]     Emergency Contact:HAMMONDSHAYLEY RHYS     MRN:[id number]     FIN:[id number]      Location:[facility]     Date of Service:2020      Primary Care Physician:       Anshul Armendariz MD, [phone]      Attending Physician:       Waqas Felton MD, [phone]  Problem List/Past Medical History    Ongoing     Acne     ASCUS on PAP Smear     Breast cancer     Microscopic Colitis     Mild Depression     Raynaud's disease    Historical     Mononucleosis       Comments: Hospitalization in high school and repeat bout in college.     Pregnancy     Ruptured Ovarian Cyst     Tobacco abuse       Comments: Quit  Procedure/Surgical History      section ()      Comments: G1, P1.     Colonoscopy (2007)     Breast surgery     Extraction of wisdom tooth     Tonsillectomy  Medications    albuterol 90 mcg/inh inhalation aerosol, 2 puff(s), Inhale, qid, PRN, 1 refills    Calcium 600+D, Oral, bid    Fish Oil, Oral    Multiple Vitamins oral tablet, Oral, daily    predniSONE 10 mg oral tablet, See Instructions  Allergies    Keflex (pain in left kidney area)    Macrobid (Rash)    metals    sulfa drug (Rash)  Social History    Smoking Status - 2020     Never smoker     Alcohol      Current, 1-2 times per month, 2 drinks/episode average., 2012     Employment/School      Employed, Work/School description: ., 2012     Exercise      Exercise frequency: 3-6 times per week. Exercise type: Bicycling, Running, Walking, Weight lifting., 2012     Home/Environment      Marital status: . Spouse/Partner name: Kristopher Osborne. 1 children., 2012     Sexual      Sexually active: Yes. Sexual orientation: Heterosexual. Other contraceptive use: OCP., 2012     Substance Abuse      Never, 2012     Tobacco      Never, 2012  Family History    Cancer of bowel: Grandmother (M).    Diabetes mellitus: Grandfather (M).    Son: History is negative    Father: History is negative    Sister: History is negative    Brother: History is  negative    Brother: History is negative  Immunizations      Vaccine Date Status          influenza virus vaccine, inactivated 02/06/2015 Given          tetanus/diphth/pertuss (Tdap) adult/adol 02/06/2015 Given          Td 12/01/2004 Recorded          MMR (measles/mumps/rubella) 12/04/2003 Recorded          Hep B 06/01/1998 Recorded          Hep B 01/01/1998 Recorded          Hep B 12/01/1997 Recorded          Td 11/01/1994 Recorded          Td 01/01/1983 Recorded

## 2022-02-15 NOTE — NURSING NOTE
Comprehensive Intake Entered On:  7/28/2021 8:42 AM CDT    Performed On:  7/28/2021 8:32 AM CDT by Constance Miller CMA               Summary   Chief Complaint :   Consult per JDL for irregular and heavy menses. Due for pap this year. Hx of breast ca - bilateral mastectomy - on Tamoxifen for 5 years.    Last Menstrual Period :   7/3/2021 CDT   Weight Measured :   155.5 lb(Converted to: 155 lb 8 oz, 70.534 kg)    Systolic Blood Pressure :   139 mmHg (HI)    Diastolic Blood Pressure :   76 mmHg   Mean Arterial Pressure :   97 mmHg   Peripheral Pulse Rate :   85 bpm   BP Site :   Right arm   Pulse Site :   Radial artery   BP Method :   Electronic   HR Method :   Electronic   Languages :   English   Constance Miller CMA - 7/28/2021 8:32 AM CDT   Health Status   Allergies Verified? :   Yes   Medication History Verified? :   Yes   Immunizations Current :   Yes   Pre-Visit Planning Status :   Completed   Tobacco Use? :   Never smoker   Constance Miller CMA - 7/28/2021 8:32 AM CDT   Demographics   Last Name :   Enrique Osborne   Address :   [address] [place] [address]   First Name :   Jasmin   City :   [place]   State :   [place]   Zip Code :   [address]   Contact Relationship to Patient (other) :   Patient   Angela Constance CUEVAS - 7/28/2021 8:32 AM CDT   Providers [place]   Provider Name :    Dr. Soto              Provider Specialty :    Oncologist                Constance Miller CMA - 7/28/2021 8:32 AM CDT         Meds / Allergies   (As Of: 7/28/2021 8:42:26 AM CDT)   Allergies (Active)   Keflex  Estimated Onset Date:   Unspecified ; Reactions:   pain in left kidney area ; Created By:   Rubi Rosado CMA; Reaction Status:   Active ; Substance:   Keflex ; Type:   Allergy ; Updated By:   Rubi Rosado CMA; Source:   Paper Chart ; Reviewed Date:   7/8/2021 7:07 AM CDT      Macrobid  Estimated Onset Date:   Unspecified ; Reactions:   Rash ; Created By:   Rubi Rosado CMA; Reaction Status:   Active ; Substance:   Macrobid ; Type:    Allergy ; Updated By:   Rubi Rosado CMA; Source:   Paper Chart ; Reviewed Date:   7/8/2021 7:07 AM CDT      metals  Estimated Onset Date:   Unspecified ; Created By:   Rose Moncada CMA; Reaction Status:   Active ; Category:   Other ; Substance:   metals ; Type:   Allergy ; Updated By:   Rose Moncada CMA; Reviewed Date:   7/8/2021 7:07 AM CDT      sulfa drug  Estimated Onset Date:   Unspecified ; Reactions:   Rash ; Created By:   Rubi Rosado CMA; Reaction Status:   Active ; Category:   Drug ; Substance:   sulfa drug ; Type:   Allergy ; Updated By:   Rubi Rosado CMA; Source:   Paper Chart ; Reviewed Date:   7/8/2021 7:07 AM CDT        Medication List   (As Of: 7/28/2021 8:42:26 AM CDT)   Home Meds    calcium-vitamin D  :   calcium-vitamin D ; Status:   Documented ; Ordered As Mnemonic:   Calcium 600+D ; Simple Display Line:   Oral, bid, 0 Refill(s) ; Catalog Code:   calcium-vitamin D ; Order Dt/Tm:   9/19/2019 5:44:04 PM CDT          multivitamin  :   multivitamin ; Status:   Documented ; Ordered As Mnemonic:   Multiple Vitamins oral tablet ; Simple Display Line:   Oral, daily, 0 Refill(s) ; Catalog Code:   multivitamin ; Order Dt/Tm:   9/19/2019 5:43:41 PM CDT          omega-3 polyunsaturated fatty acids  :   omega-3 polyunsaturated fatty acids ; Status:   Documented ; Ordered As Mnemonic:   Fish Oil ; Simple Display Line:   Oral, 0 Refill(s) ; Catalog Code:   omega-3 polyunsaturated fatty acids ; Order Dt/Tm:   3/18/2019 4:42:43 PM CDT          ubiquinone  :   ubiquinone ; Status:   Documented ; Ordered As Mnemonic:   Co Q-10 ; Simple Display Line:   100 mg, Oral, daily, 0 Refill(s) ; Catalog Code:   ubiquinone ; Order Dt/Tm:   7/8/2021 7:07:35 AM CDT

## 2022-02-15 NOTE — PROGRESS NOTES
Patient:   ABIGAIL SWARTZ            MRN: [id number]            FIN: [id number]               Age:   54 years     Sex:  Female     :  1965   Associated Diagnoses:   Right lateral epicondylitis; Left wrist tendonitis   Author:   Melvin Rdz MD      Visit Information      Date of Service: 2020 05:56 pm  [facility] [employer]  [place]  Encounter#: [id number]      Primary Care Provider (PCP):  Anshul Armendariz MD    NPI# [id number]      Referring Provider:  Melvin Rdz MD    NPI# [id number]      Chief Complaint   3/4/2020 6:01 PM CST     c/o right elbow pain since Nov having a hard time gripping objects, left wrist pain        History of Present Illness   Injured right elbow end of November hanging onto alot of stuff for a long period of time. Symptoms have decreased but not resolved. Symptoms plateaued. When reaches with right hand or  something has pain in right elbow.    Yesterday left wrist pain started (reaggravated). It is a chronic recurring problem. Pain is on medial side.      Review of Systems   No weakness in the arms  No numbness in the arms      Health Status   Allergies:    Allergic Reactions (Selected)  Severity Not Documented  Keflex (Pain in left kidney area)  Macrobid (Rash)  Metals (No reactions were documented)  Sulfa drug (Rash)   Medications:  (Selected)   Documented Medications  Documented  Calcium 600+D: Oral, bid, 0 Refill(s), Type: Maintenance  Fish Oil: Oral, 0 Refill(s), Type: Maintenance  Multiple Vitamins oral tablet: Oral, daily, 0 Refill(s), Type: Maintenance  tamoxifen 20 mg oral tablet: 0 Refill(s), Type: Soft Stop   Problem list:    All Problems  Acne / ICD-9-.1 / Confirmed  ASCUS on PAP Smear / ICD-9-.01 / Confirmed  Breast cancer / SNOMED CT 734316497 / Confirmed  Microscopic Colitis / ICD-9-.9 / Confirmed  Mild Depression / ICD-9-.21 / Confirmed  Raynaud's disease / SNOMED CT 9474507491 / Confirmed  Resolved:  Mononucleosis / ICD-9-  Resolved: Pregnancy / SNOMED CT 917395287  Resolved: Ruptured Ovarian Cyst / ICD-9-.2  Resolved: Tobacco abuse / SNOMED CT 940065688      Histories   Procedure history:     section (SNOMED CT 44943702) in the month of 2009 at 43 Years.  Comments:  3/17/2011 7:24 AM CDT - Zahra Jasmine  G1, P1  Colonoscopy (SNOMED CT 306423737) on 2007 at 41 Years.  Tonsillectomy (SNOMED CT 209654834).  Extraction of wisdom tooth (SNOMED CT 615865680).  Breast surgery.      Physical Examination   Vital Signs   3/4/2020 6:01 PM CST Temperature Tympanic 97.6 DegF  LOW    Peripheral Pulse Rate 90 bpm    Pulse Site Radial artery    HR Method Manual    Systolic Blood Pressure 120 mmHg    Diastolic Blood Pressure 60 mmHg    Mean Arterial Pressure 80 mmHg    BP Site Right arm    BP Method Manual    Oxygen Saturation 97 %      Measurements from flowsheet : Measurements   3/4/2020 6:01 PM CST Height Measured - Standard 65 in    Weight Measured - Standard 150.6 lb    BSA 1.77 m2    Body Mass Index 25.06 kg/m2  HI      General:  Alert and oriented, No acute distress.    Musculoskeletal:  Normal gait.    Musculoskeletal: pain lateral right epicondyle and with wrist extension. pain medial wrist and mild pain with wrist flexion  Skin: no bruising or swelling      Impression and Plan   Diagnosis     Right lateral epicondylitis (OQH44-UY M77.11).     Left wrist tendonitis (JDX27-VT M77.8).       Physical Therapy for both. Declines NSAIDs. Follow up if not improving.

## 2022-02-15 NOTE — NURSING NOTE
Comprehensive Intake Entered On:  9/12/2020 8:57 AM CDT    Performed On:  9/12/2020 8:51 AM CDT by Kavya Barrios               Summary   Chief Complaint :   Sinus infection    Weight Measured :   150 lb(Converted to: 150 lb 0 oz, 68.04 kg)    Height Measured :   65 in(Converted to: 5 ft 5 in, 165.10 cm)    Body Mass Index :   24.96 kg/m2   Body Surface Area :   1.76 m2   Systolic Blood Pressure :   116 mmHg   Diastolic Blood Pressure :   64 mmHg   Mean Arterial Pressure :   81 mmHg   Peripheral Pulse Rate :   75 bpm   Temperature Tympanic :   97.5 DegF(Converted to: 36.4 DegC)  (LOW)    Oxygen Saturation :   98 %   Languages :   English   Kavya Barrios - 9/12/2020 8:51 AM CDT   Demographics   Last Name :   Enrique India   Address :   [address] [place] [address]   First Name :   Jasmin   City :   [place]   State :   [place]   Zip Code :   [address]   Kavya Barrios - 9/12/2020 8:51 AM CDT   Providers Grid   Provider Name :    Dr. Soto              Provider Specialty :    Oncologist                Kavya Barrios - 9/12/2020 8:51 AM CDT         Meds / Allergies   (As Of: 9/12/2020 8:57:38 AM CDT)   Allergies (Active)   Keflex  Estimated Onset Date:   Unspecified ; Reactions:   pain in left kidney area ; Created By:   Rubi Larsen; Reaction Status:   Active ; Substance:   Keflex ; Type:   Allergy ; Updated By:   Rubi Larsen; Source:   Paper Chart ; Reviewed Date:   9/12/2020 8:55 AM CDT      Macrobid  Estimated Onset Date:   Unspecified ; Reactions:   Rash ; Created By:   Rubi Larsen; Reaction Status:   Active ; Substance:   Macrobid ; Type:   Allergy ; Updated By:   Rubi Larsen; Source:   Paper Chart ; Reviewed Date:   9/12/2020 8:55 AM CDT      metals  Estimated Onset Date:   Unspecified ; Created By:   Rose Moncada CMA; Reaction Status:   Active ; Category:   Other ; Substance:   metals ; Type:   Allergy ; Updated By:   Rose Moncada CMA; Reviewed  Date:   9/12/2020 8:55 AM CDT      sulfa drug  Estimated Onset Date:   Unspecified ; Reactions:   Rash ; Created By:   Rubi Larsen; Reaction Status:   Active ; Category:   Drug ; Substance:   sulfa drug ; Type:   Allergy ; Updated By:   Rubi Larsen; Source:   Paper Chart ; Reviewed Date:   9/12/2020 8:55 AM CDT        Medication List   (As Of: 9/12/2020 8:57:38 AM CDT)   Prescription/Discharge Order    predniSONE  :   predniSONE ; Status:   Completed ; Ordered As Mnemonic:   predniSONE 10 mg oral tablet ; Simple Display Line:   See Instructions, 2 tab(s) PO Daily 7 day(s) then 1 tab po daily for next 7 days, 21 tab(s), 0 Refill(s) ; Ordering Provider:   Waqas Felton MD; Catalog Code:   predniSONE ; Order Dt/Tm:   7/9/2020 11:51:01 AM CDT          albuterol  :   albuterol ; Status:   Processing ; Ordered As Mnemonic:   albuterol 90 mcg/inh inhalation aerosol ; Ordering Provider:   Ally Higginbotham MD; Action Display:   Complete ; Catalog Code:   albuterol ; Order Dt/Tm:   9/12/2020 8:55:49 AM CDT            Home Meds    calcium-vitamin D  :   calcium-vitamin D ; Status:   Documented ; Ordered As Mnemonic:   Calcium 600+D ; Simple Display Line:   Oral, bid, 0 Refill(s) ; Catalog Code:   calcium-vitamin D ; Order Dt/Tm:   9/19/2019 5:44:04 PM CDT          multivitamin  :   multivitamin ; Status:   Documented ; Ordered As Mnemonic:   Multiple Vitamins oral tablet ; Simple Display Line:   Oral, daily, 0 Refill(s) ; Catalog Code:   multivitamin ; Order Dt/Tm:   9/19/2019 5:43:41 PM CDT          omega-3 polyunsaturated fatty acids  :   omega-3 polyunsaturated fatty acids ; Status:   Documented ; Ordered As Mnemonic:   Fish Oil ; Simple Display Line:   Oral, 0 Refill(s) ; Catalog Code:   omega-3 polyunsaturated fatty acids ; Order Dt/Tm:   3/18/2019 4:42:43 PM CDT            ID Risk Screen   Recent Travel History :   No recent travel   Family Member Travel History :   No recent travel   Other  Exposure to Infectious Disease :   Unknown   Kavya Barrios - 9/12/2020 8:51 AM CDT

## 2022-02-15 NOTE — NURSING NOTE
Comprehensive Intake Entered On:  9/19/2019 5:47 PM CDT    Performed On:  9/19/2019 5:44 PM CDT by Allison Knott CMA               Summary   Chief Complaint :   Right breast pain x 1-2 mos- s/p BL mastectomy 4 years ago with reconstructive surgery in 7/2015.  Had one episode of exteme dizziness 1 wk ago   Weight Measured :   147 lb(Converted to: 147 lb 0 oz, 66.68 kg)    Systolic Blood Pressure :   90 mmHg   Diastolic Blood Pressure :   68 mmHg   Mean Arterial Pressure :   75 mmHg   Peripheral Pulse Rate :   60 bpm   Temperature Tympanic :   98.9 DegF(Converted to: 37.2 DegC)    Languages :   English   Jesusdeepak CUEVAS Allison - 9/19/2019 5:44 PM CDT   Health Status   Allergies Verified? :   Yes   Medication History Verified? :   Yes   Immunizations Current :   Yes   Medical History Verified? :   Yes   Pre-Visit Planning Status :   Completed   Tobacco Use? :   Never smoker   JesusAllison sotelo CMA - 9/19/2019 5:44 PM CDT   Social History   Social History   (As Of: 9/19/2019 5:47:16 PM CDT)   Alcohol:        Current, 1-2 times per month, 2 drinks/episode average.   (Last Updated: 4/3/2012 10:17:40 AM CDT by Rose Moncada CMA)          Tobacco:        Never   (Last Updated: 4/3/2012 10:17:55 AM CDT by Rose Moncada CMA)          Substance Abuse:        Never   (Last Updated: 4/3/2012 10:18:04 AM CDT by Rose Moncada CMA)          Employment/School:        Employed, Work/School description: .   (Last Updated: 4/3/2012 10:18:20 AM CDT by Rose Moncada CMA)          Home/Environment:        Marital status: .  Spouse/Partner name: Kristopher Osborne.  1 children.   (Last Updated: 4/3/2012 10:18:51 AM CDT by Rose Moncada CMA)          Exercise:        Exercise frequency: 3-6 times per week.  Exercise type: Bicycling, Running, Walking, Weight lifting.   (Last Updated: 4/3/2012 10:19:26 AM CDT by Rose Moncada CMA)          Sexual:        Sexually active: Yes.  Sexual orientation: Heterosexual.  Other  contraceptive use: OCP.   (Last Updated: 4/3/2012 10:23:21 AM CDT by Rose Moncada CMA)

## 2022-02-15 NOTE — NURSING NOTE
Comprehensive Intake Entered On:  3/18/2019 4:43 PM CDT    Performed On:  3/18/2019 4:37 PM CDT by Kavya Barrios               Summary   Chief Complaint :   Vertigo starting this morning. Has gone to PT in the past for vertigo.    Weight Measured :   147 lb(Converted to: 147 lb 0 oz, 66.68 kg)    Height Measured :   65 in(Converted to: 5 ft 5 in, 165.10 cm)    Body Mass Index :   24.46 kg/m2   Body Surface Area :   1.75 m2   Systolic Blood Pressure :   90 mmHg   Diastolic Blood Pressure :   52 mmHg (LOW)    Mean Arterial Pressure :   65 mmHg   Peripheral Pulse Rate :   70 bpm   Temperature Tympanic :   98.4 DegF(Converted to: 36.9 DegC)    Oxygen Saturation :   99 %   Languages :   English   Kavya Barrios - 3/18/2019 4:37 PM CDT   Health Status   Allergies Verified? :   Yes   Medication History Verified? :   No   Immunizations Current :   Yes   Medical History Verified? :   Yes   Pre-Visit Planning Status :   Completed   Tobacco Use? :   Never smoker   Kavya Barrios - 3/18/2019 4:37 PM CDT   Consents   Consent for Immunization Exchange :   Consent Granted   Consent for Immunizations to Providers :   Consent Granted   Kavya Barrios - 3/18/2019 4:37 PM CDT   Meds / Allergies   (As Of: 3/18/2019 4:43:04 PM CDT)   Allergies (Active)   Keflex  Estimated Onset Date:   Unspecified ; Reactions:   pain in left kidney area ; Created By:   Rubi Larsen; Reaction Status:   Active ; Substance:   Keflex ; Type:   Allergy ; Updated By:   Rubi Larsen; Source:   Paper Chart ; Reviewed Date:   3/18/2019 4:43 PM CDT      Macrobid  Estimated Onset Date:   Unspecified ; Reactions:   Rash ; Created By:   Rubi Larsen; Reaction Status:   Active ; Substance:   Macrobid ; Type:   Allergy ; Updated By:   Rubi Larsen; Source:   Paper Chart ; Reviewed Date:   3/18/2019 4:43 PM CDT      metals  Estimated Onset Date:   Unspecified ; Created By:   Rose Moncada CMA;  Reaction Status:   Active ; Category:   Other ; Substance:   metals ; Type:   Allergy ; Updated By:   Rose Moncada CMA; Reviewed Date:   3/18/2019 4:43 PM CDT      sulfa drug  Estimated Onset Date:   Unspecified ; Reactions:   Rash ; Created By:   Rubi Larsen; Reaction Status:   Active ; Category:   Drug ; Substance:   sulfa drug ; Type:   Allergy ; Updated By:   Rubi Larsen; Source:   Paper Chart ; Reviewed Date:   3/18/2019 4:43 PM CDT        Medication List   (As Of: 3/18/2019 4:43:04 PM CDT)   Home Meds    omega-3 polyunsaturated fatty acids  :   omega-3 polyunsaturated fatty acids ; Status:   Documented ; Ordered As Mnemonic:   Fish Oil ; Simple Display Line:   Oral, 0 Refill(s) ; Catalog Code:   omega-3 polyunsaturated fatty acids ; Order Dt/Tm:   3/18/2019 4:42:43 PM          melatonin  :   melatonin ; Status:   Completed ; Ordered As Mnemonic:   melatonin 3 mg oral tablet ; Simple Display Line:   3 mg, 1 tab(s), po, hs, 0 Refill(s) ; Catalog Code:   melatonin ; Order Dt/Tm:   3/7/2016 5:00:16 PM          tamoxifen  :   tamoxifen ; Status:   Documented ; Ordered As Mnemonic:   tamoxifen ; Simple Display Line:   po, daily ; Catalog Code:   tamoxifen ; Order Dt/Tm:   6/8/2015 6:27:17 PM

## 2022-02-15 NOTE — NURSING NOTE
Comprehensive Intake Entered On:  6/25/2021 3:50 PM CDT    Performed On:  6/25/2021 3:42 PM CDT by Pau Acuña               Summary   Chief Complaint :   Pt c/o R side abdominal pain x 4 days. She has had some diarrhea and nausea. The pain hurts when she sits or walks and when she would pass gas. She notes she had severe menstrual bleeding in the last 2 months prior it had been 8 months   Last Menstrual Period :   5/30/2021 CDT   Menstrual Status :   Menarcheal   Weight Measured :   156.8 lb(Converted to: 156 lb 13 oz, 71.123 kg)    Systolic Blood Pressure :   100 mmHg   Diastolic Blood Pressure :   72 mmHg   Mean Arterial Pressure :   81 mmHg   Peripheral Pulse Rate :   61 bpm   BP Site :   Right arm   BP Method :   Manual   Temperature Tympanic :   97.6 DegF(Converted to: 36.4 DegC)  (LOW)    Respiratory Rate :   16 br/min   Oxygen Saturation :   98 %   Languages :   English   Pau Acuña - 6/25/2021 3:42 PM CDT   Health Status   Allergies Verified? :   Yes   Medication History Verified? :   Yes   Immunizations Current :   Yes   Tobacco Use? :   Never smoker   Pau Acuña - 6/25/2021 3:42 PM CDT   Consents   Consent for Immunization Exchange :   Consent Granted   Consent for Immunizations to Providers :   Consent Granted   Pau Acuña - 6/25/2021 3:42 PM CDT   Meds / Allergies   (As Of: 6/25/2021 3:50:56 PM CDT)   Allergies (Active)   Keflex  Estimated Onset Date:   Unspecified ; Reactions:   pain in left kidney area ; Created By:   Rubi Rosado CMA; Reaction Status:   Active ; Substance:   Keflex ; Type:   Allergy ; Updated By:   Rubi Rosado CMA; Source:   Paper Chart ; Reviewed Date:   6/25/2021 3:47 PM CDT      Macrobid  Estimated Onset Date:   Unspecified ; Reactions:   Rash ; Created By:   Rubi Rosado CMA; Reaction Status:   Active ; Substance:   Macrobid ; Type:   Allergy ; Updated By:   Rubi Rosado CMA; Source:   Paper Chart ; Reviewed Date:   6/25/2021  3:47 PM CDT      metals  Estimated Onset Date:   Unspecified ; Created By:   Rose Moncada CMA; Reaction Status:   Active ; Category:   Other ; Substance:   metals ; Type:   Allergy ; Updated By:   Rose Moncada CMA; Reviewed Date:   6/25/2021 3:47 PM CDT      sulfa drug  Estimated Onset Date:   Unspecified ; Reactions:   Rash ; Created By:   Rubi Rosado CMA; Reaction Status:   Active ; Category:   Drug ; Substance:   sulfa drug ; Type:   Allergy ; Updated By:   Rubi Rosado CMA; Source:   Paper Chart ; Reviewed Date:   6/25/2021 3:47 PM CDT        Medication List   (As Of: 6/25/2021 3:50:56 PM CDT)   Home Meds    calcium-vitamin D  :   calcium-vitamin D ; Status:   Documented ; Ordered As Mnemonic:   Calcium 600+D ; Simple Display Line:   Oral, bid, 0 Refill(s) ; Catalog Code:   calcium-vitamin D ; Order Dt/Tm:   9/19/2019 5:44:04 PM CDT          multivitamin  :   multivitamin ; Status:   Documented ; Ordered As Mnemonic:   Multiple Vitamins oral tablet ; Simple Display Line:   Oral, daily, 0 Refill(s) ; Catalog Code:   multivitamin ; Order Dt/Tm:   9/19/2019 5:43:41 PM CDT          omega-3 polyunsaturated fatty acids  :   omega-3 polyunsaturated fatty acids ; Status:   Documented ; Ordered As Mnemonic:   Fish Oil ; Simple Display Line:   Oral, 0 Refill(s) ; Catalog Code:   omega-3 polyunsaturated fatty acids ; Order Dt/Tm:   3/18/2019 4:42:43 PM CDT            Social History   Social History   (As Of: 6/25/2021 3:50:56 PM CDT)   Alcohol:        Current, 1-2 times per month, 2 drinks/episode average.   (Last Updated: 4/3/2012 10:17:40 AM CDT by Rose Moncada CMA)          Tobacco:        Never (less than 100 in lifetime)   (Last Updated: 1/25/2021 11:44:48 AM CST by Constance Miller CMA)          Electronic Cigarette/Vaping:        Electronic Cigarette Use: Never.   (Last Updated: 1/25/2021 11:44:51 AM CST by Constance Miller CMA)          Substance Abuse:        Never   (Last Updated:  4/3/2012 10:18:04 AM CDT by Rose Moncada CMA)          Employment/School:        Employed, Work/School description: .   (Last Updated: 4/3/2012 10:18:20 AM CDT by Rose Moncada CMA)          Home/Environment:        Marital status: .  Spouse/Partner name: Kristopher Osborne.  1 children.   (Last Updated: 4/3/2012 10:18:51 AM CDT by Rose Moncada CMA)          Exercise:        Exercise frequency: 3-6 times per week.  Exercise type: Bicycling, Running, Walking, Weight lifting.   (Last Updated: 4/3/2012 10:19:26 AM CDT by Rose Moncada CMA)          Sexual:        Sexually active: Yes.  Sexual orientation: Heterosexual.  Other contraceptive use: OCP.   (Last Updated: 4/3/2012 10:23:21 AM CDT by Rose Moncada CMA)

## 2022-02-15 NOTE — PROGRESS NOTES
Chief Complaint    c/o cough present for 2 weeks. Negative COVID testing.  History of Present Illness      Patient is a 54-year-old female who comes in complaining of worsening cough today.      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  She does work with many people though.      She has been sick for just over 10 days.  10 days ago she had a negative coronavirus test.        She has headache; she is very tired.      She has cough, sore throat, and laryngitis.       She had a video visit 3 days ago and started a Z-Issac at that time.       Today she went for a short walk with her dog and she noted wheezing.  This was new today.       No history of asthma or allergies.       She has no production to the cough.       No chest pain or dyspnea.  Just the wheezing.       She has had no fever the entire time of this illness.  No issues with taste or smell.  No abdominal issues.       Her son had a similar cold symptoms illness and he also tested negative for coronavirus.  He got better after a course of antibiotics.       Her  is also sick with this type of illness.  He has not been tested for coronavirus and he has a productive cough.  Review of Systems      Negative except as listed in HPI.  Physical Exam   Vitals & Measurements    T: 98.4   F (Tympanic)  HR: 82(Peripheral)  BP: 102/76  SpO2: 99%     WT: 152.2 lb       In general she is alert, oriented, and in no acute distress.       Her vitals are noted and within normal limits including an oxygen saturation of 99% on room air.       Ears: Canals patent, TMs intact, no erythema no bulging.       Mouth mucous membranes are pink and moist.  Pharynx is not erythematous.       Neck supple with no cervical lymphadenopathy.       Heart has a regular rate and rhythm with no murmurs.       Lungs have good air entry bilaterally and there are wheezes and rhonchi throughout bilateral lung fields.  Assessment/Plan       Acute URI (J06.9)          did not do a chest xray today as it would not change our treatment.        continue zpak        will add prednisone and an albuterol inhaler for the wheezing        OK to take walks and encouraged her to spend some time outdoors each day        we discussed whether or not she should be re-tested for coronavirus.  I do not think it is necessary given her illness pattern and the fact that she will not return to work until feeling better but she will call her employee health and discuss.        Follow up if not improving as anticipated.          Ordered:                Cough (R05)         Ordered:                Wheezing (R06.2)         Ordered:          albuterol, 2 puff(s), Inhale, qid, PRN: for wheezing, # 17 gm, 1 Refill(s), Type: Maintenance, Pharmacy: [facility] #[id number], 2 puff(s) Inhale qid,PRN:for wheezing, 65, in, 06/24/20 17:04:00 CDT, Height Measured, 152.2, lb, 06/27/20 11:29:00 CDT, Weight..., (Ordered)          predniSONE, = 2 tab(s) ( 40 mg ), Oral, daily, x 7 day(s), # 14 tab(s), 0 Refill(s), Type: Acute, Pharmacy: [facility] #[id number], 2 tab(s) Oral daily,x7 day(s), 65, in, 06/24/20 17:04:00 CDT, Height Measured, 152.2, lb, 06/27/20 11:29:00 CDT, Weight Measured, (Ordered)           Patient Information     Name:ADE ABIGAIL HAMMONDS      Address:      [address] [place] [id number]     Sex:Female     YOB: 1965     [phone]     Emergency Contact:HAYLEY HAMMONDS     MRN:[id number]     FIN:[id number]     Location:[facility]     Date of Service:06/27/2020      Primary Care Physician:       Anshul Armendariz MD, [phone]      Attending Physician:       Ally Higginbotham MD, [phone]  Problem List/Past Medical History    Ongoing     Acne     ASCUS on PAP Smear     Breast cancer     Microscopic Colitis     Mild Depression     Raynaud's disease    Historical     Mononucleosis       Comments: Hospitalization in high school and repeat  bout in college.     Pregnancy     Ruptured Ovarian Cyst     Tobacco abuse       Comments: Quit  Procedure/Surgical History      section ()      Comments: G1, P1.     Colonoscopy (2007)     Breast surgery     Extraction of wisdom tooth     Tonsillectomy  Medications    albuterol 90 mcg/inh inhalation aerosol, 2 puff(s), Inhale, qid, PRN, 1 refills    Calcium 600+D, Oral, bid    Fish Oil, Oral    Multiple Vitamins oral tablet, Oral, daily    predniSONE 20 mg oral tablet, 40 mg= 2 tab(s), Oral, daily    Zithromax Z-Issac 250 mg oral tablet, Take 2 tablets on Day 1 and then 1 tablet, Oral, daily  Allergies    Keflex (pain in left kidney area)    Macrobid (Rash)    metals    sulfa drug (Rash)  Social History    Smoking Status - 2020     Never smoker     Alcohol      Current, 1-2 times per month, 2 drinks/episode average., 2012     Employment/School      Employed, Work/School description: ., 2012     Exercise      Exercise frequency: 3-6 times per week. Exercise type: Bicycling, Running, Walking, Weight lifting., 2012     Home/Environment      Marital status: . Spouse/Partner name: Kristopher Osborne. 1 children., 2012     Sexual      Sexually active: Yes. Sexual orientation: Heterosexual. Other contraceptive use: OCP., 2012     Substance Abuse      Never, 2012     Tobacco      Never, 2012  Family History    Cancer of bowel: Grandmother (M).    Diabetes mellitus: Grandfather (M).    Son: History is negative    Father: History is negative    Sister: History is negative    Brother: History is negative    Brother: History is negative  Immunizations      Vaccine Date Status          influenza virus vaccine, inactivated 2015 Given          tetanus/diphth/pertuss (Tdap) adult/adol 2015 Given          Td 2004 Recorded          MMR (measles/mumps/rubella) 2003 Recorded          Hep B 1998 Recorded          Hep B  01/01/1998 Recorded          Hep B 12/01/1997 Recorded          Td 11/01/1994 Recorded          Td 01/01/1983 Recorded

## 2022-02-15 NOTE — PROGRESS NOTES
Chief Complaint    Pt c/o R side abdominal pain x 4 days. She has had some diarrhea and nausea. The pain hurts when she sits or walks and when she would pass gas. She notes she had severe menstrual bleeding in the last 2 months prior it had been 8 months  History of Present Illness       Patient presents with right lower quadrant pain.  Discomfort began 4 days ago.  She has had mild nausea and diarrhea.  Is worse with standing or walking.  No fever or chills.  No vomiting.  No rectal bleeding.  No dysuria.  Review of Systems       No cough, dyspnea, headache, myalgia.  Physical Exam   Vitals & Measurements    T: 97.6  F (Tympanic)  HR: 61 (Peripheral)  RR: 16  BP: 100/72  SpO2: 98%     WT: 156.8 lb        Patient is a healthy-appearing woman in no acute distress.  Alert and oriented.  Chest clear.  Cardiac exam is regular.  No edema.  Abdomen is soft with exquisite right lower quadrant tenderness without peritoneal signs.  Assessment/Plan       Abdominal pain, RLQ (R10.31)          Very suspicious of appendicitis.  I spoke with the ER provider Dr. Simmons and she will proceed to the emergency department for evaluation.         Ordered:          [id number] office o/p est low 20-29 min (Charge), Quantity: 1, Abdominal pain, RLQ           Patient Information     Name:SALINAS SWARTZJARVIS ALBERT      Address:      [address] [place] [id number]     Sex:Female     YOB: 1965     [phone]     Emergency Contact:HAYLEY HAMMONDS     MRN:[id number]     FIN:[id number]     Location:[facility]     Date of Service:06/25/2021      Primary Care Physician:       Anshul Armendariz MD, [phone]      Attending Physician:       Delbert Ayala MD, [phone]  Problem List/Past Medical History    Ongoing     Acne     ASCUS on PAP Smear     Breast cancer     Microscopic Colitis     Mild Depression     Raynaud's disease    Historical     Mononucleosis       Comments: Hospitalization in high school and repeat  bout in college.     Pregnancy     Ruptured Ovarian Cyst     Tobacco abuse       Comments: Quit  Procedure/Surgical History      section (2009)      Comments: G1, P1.     Colonoscopy (2007)     Breast surgery     Extraction of wisdom tooth     Tonsillectomy  Medications    Calcium 600+D, Oral, bid    Fish Oil, Oral    Multiple Vitamins oral tablet, Oral, daily  Allergies    Keflex (pain in left kidney area)    Macrobid (Rash)    metals    sulfa drug (Rash)  Social History    Smoking Status     Never smoker     Alcohol      Current, 1-2 times per month, 2 drinks/episode average.     Electronic Cigarette/Vaping      Electronic Cigarette Use: Never.     Employment/School      Employed, Work/School description: .     Exercise      Exercise frequency: 3-6 times per week. Exercise type: Bicycling, Running, Walking, Weight lifting.     Home/Environment      Marital status: . Spouse/Partner name: Kristopher Osborne. 1 children.     Sexual      Sexually active: Yes. Sexual orientation: Heterosexual. Other contraceptive use: OCP.     Substance Abuse      Never     Tobacco      Never (less than 100 in lifetime)  Family History    Cancer of bowel: Grandmother (M).    Diabetes mellitus: Grandfather (M).    Son: History is negative    Father: History is negative    Sister: History is negative    Brother: History is negative    Brother: History is negative  Immunizations          Other Immunizations          Administration Dosage Date(s)          Hep B          1997, 1998, 1998          MMR (measles/mumps/rubella)          2003          influenza virus vaccine, inactivated          2015          Td          1983, 1994, 2004          tetanus/diphth/pertuss (Tdap) adult/adol          2015

## 2022-02-15 NOTE — NURSING NOTE
Quick Intake Entered On:  6/27/2020 11:33 AM CDT    Performed On:  6/27/2020 11:29 AM CDT by Courtney Shah CMA               Summary   Chief Complaint :   c/o cough present for 2 weeks. Negative COVID testing.    Weight Measured :   152.2 lb(Converted to: 152 lb 3 oz, 69.04 kg)    Systolic Blood Pressure :   102 mmHg   Diastolic Blood Pressure :   76 mmHg   Mean Arterial Pressure :   85 mmHg   Peripheral Pulse Rate :   82 bpm   BP Site :   Right arm   BP Method :   Manual   HR Method :   Electronic   Temperature Tympanic :   98.4 DegF(Converted to: 36.9 DegC)    Oxygen Saturation :   99 %   Languages :   English   Courtney Shah CMA - 6/27/2020 11:29 AM CDT   Health Status   Allergies Verified? :   Yes   Medication History Verified? :   Yes   Immunizations Current :   Yes   Pre-Visit Planning Status :   N/A   Tobacco Use? :   Never smoker   Courtney Shah CMA - 6/27/2020 11:29 AM CDT   Consents   Consent for Immunization Exchange :   Consent Granted   Consent for Immunizations to Providers :   Consent Granted   Courtney Shah CMA - 6/27/2020 11:29 AM CDT   Meds / Allergies   (As Of: 6/27/2020 11:33:50 AM CDT)   Allergies (Active)   Keflex  Estimated Onset Date:   Unspecified ; Reactions:   pain in left kidney area ; Created By:   Rubi Larsen; Reaction Status:   Active ; Substance:   Keflex ; Type:   Allergy ; Updated By:   Rubi Larsen; Source:   Paper Chart ; Reviewed Date:   6/24/2020 5:07 PM CDT      Macrobid  Estimated Onset Date:   Unspecified ; Reactions:   Rash ; Created By:   Rubi Larsen; Reaction Status:   Active ; Substance:   Macrobid ; Type:   Allergy ; Updated By:   Rubi Larsen; Source:   Paper Chart ; Reviewed Date:   6/24/2020 5:07 PM CDT      metals  Estimated Onset Date:   Unspecified ; Created By:   Rose Moncada CMA; Reaction Status:   Active ; Category:   Other ; Substance:   metals ; Type:   Allergy ; Updated By:   Rose Moncada CMA; Reviewed  Date:   6/24/2020 5:07 PM CDT      sulfa drug  Estimated Onset Date:   Unspecified ; Reactions:   Rash ; Created By:   Rubi Larsen; Reaction Status:   Active ; Category:   Drug ; Substance:   sulfa drug ; Type:   Allergy ; Updated By:   Rubi Larsen; Source:   Paper Chart ; Reviewed Date:   6/24/2020 5:07 PM CDT        Medication List   (As Of: 6/27/2020 11:33:50 AM CDT)   Prescription/Discharge Order    azithromycin  :   azithromycin ; Status:   Prescribed ; Ordered As Mnemonic:   Zithromax Z-Issac 250 mg oral tablet ; Simple Display Line:   Take 2 tablets on Day 1 and then 1 tablet, Oral, daily, for 5 day(s), until finished, 6 tab(s), 0 Refill(s) ; Ordering Provider:   Melvin Rdz MD; Catalog Code:   azithromycin ; Order Dt/Tm:   6/24/2020 5:29:12 PM CDT            Home Meds    calcium-vitamin D  :   calcium-vitamin D ; Status:   Documented ; Ordered As Mnemonic:   Calcium 600+D ; Simple Display Line:   Oral, bid, 0 Refill(s) ; Catalog Code:   calcium-vitamin D ; Order Dt/Tm:   9/19/2019 5:44:04 PM CDT          multivitamin  :   multivitamin ; Status:   Documented ; Ordered As Mnemonic:   Multiple Vitamins oral tablet ; Simple Display Line:   Oral, daily, 0 Refill(s) ; Catalog Code:   multivitamin ; Order Dt/Tm:   9/19/2019 5:43:41 PM CDT          omega-3 polyunsaturated fatty acids  :   omega-3 polyunsaturated fatty acids ; Status:   Documented ; Ordered As Mnemonic:   Fish Oil ; Simple Display Line:   Oral, 0 Refill(s) ; Catalog Code:   omega-3 polyunsaturated fatty acids ; Order Dt/Tm:   3/18/2019 4:42:43 PM CDT          tamoxifen  :   tamoxifen ; Status:   Completed ; Ordered As Mnemonic:   tamoxifen 20 mg oral tablet ; Simple Display Line:   0 Refill(s) ; Catalog Code:   tamoxifen ; Order Dt/Tm:   9/19/2019 5:44:28 PM CDT ; Comment:   Responsible Provider: OSIRIS ARGUETA            ID Risk Screen   Recent Travel History :   No recent travel   Family Member Travel History :   No  recent travel   Other Exposure to Infectious Disease :   Unknown   Courtney Shah CMA - 6/27/2020 11:29 AM CDT

## 2022-02-15 NOTE — NURSING NOTE
Comprehensive Intake Entered On:  7/8/2021 7:09 AM CDT    Performed On:  7/8/2021 7:04 AM CDT by Chelita Miles               Summary   Chief Complaint :   f/u CT scan- c/o discomfort   Menstrual Status :   Menarcheal   Weight Measured :   157.3 lb(Converted to: 157 lb 5 oz, 71.350 kg)    Systolic Blood Pressure :   109 mmHg   Diastolic Blood Pressure :   76 mmHg   Mean Arterial Pressure :   87 mmHg   Peripheral Pulse Rate :   62 bpm   BP Site :   Right arm   BP Method :   Electronic   HR Method :   Electronic   Temperature Tympanic :   97.9 DegF(Converted to: 36.6 DegC)    Languages :   English   Chelita Miles - 7/8/2021 7:04 AM CDT   Health Status   Allergies Verified? :   Yes   Medication History Verified? :   Yes   Immunizations Current :   Yes   Medical History Verified? :   Yes   Pre-Visit Planning Status :   Completed   Tobacco Use? :   Never smoker   Chelita Miles - 7/8/2021 7:04 AM CDT   Consents   Consent for Immunization Exchange :   Consent Granted   Consent for Immunizations to Providers :   Consent Granted   Chelita Miles - 7/8/2021 7:04 AM CDT   Meds / Allergies   (As Of: 7/8/2021 7:09:05 AM CDT)   Allergies (Active)   Keflex  Estimated Onset Date:   Unspecified ; Reactions:   pain in left kidney area ; Created By:   Rubi Rosado CMA; Reaction Status:   Active ; Substance:   Keflex ; Type:   Allergy ; Updated By:   Rubi Rosado CMA; Source:   Paper Chart ; Reviewed Date:   7/8/2021 7:07 AM CDT      Macrobid  Estimated Onset Date:   Unspecified ; Reactions:   Rash ; Created By:   Rubi Rosado CMA; Reaction Status:   Active ; Substance:   Macrobid ; Type:   Allergy ; Updated By:   Rubi Rosado CMA; Source:   Paper Chart ; Reviewed Date:   7/8/2021 7:07 AM CDT      metals  Estimated Onset Date:   Unspecified ; Created By:   Rose Moncada CMA; Reaction Status:   Active ; Category:   Other ; Substance:   metals ; Type:   Allergy ; Updated By:   Antwan  Rose CUEVAS; Reviewed Date:   7/8/2021 7:07 AM CDT      sulfa drug  Estimated Onset Date:   Unspecified ; Reactions:   Rash ; Created By:   Rubi Rosado CMA; Reaction Status:   Active ; Category:   Drug ; Substance:   sulfa drug ; Type:   Allergy ; Updated By:   Rubi Rosado CMA; Source:   Paper Chart ; Reviewed Date:   7/8/2021 7:07 AM CDT        Medication List   (As Of: 7/8/2021 7:09:05 AM CDT)   Home Meds    ubiquinone  :   ubiquinone ; Status:   Documented ; Ordered As Mnemonic:   Co Q-10 ; Simple Display Line:   100 mg, Oral, daily, 0 Refill(s) ; Catalog Code:   ubiquinone ; Order Dt/Tm:   7/8/2021 7:07:35 AM CDT          omega-3 polyunsaturated fatty acids  :   omega-3 polyunsaturated fatty acids ; Status:   Documented ; Ordered As Mnemonic:   Fish Oil ; Simple Display Line:   Oral, 0 Refill(s) ; Catalog Code:   omega-3 polyunsaturated fatty acids ; Order Dt/Tm:   3/18/2019 4:42:43 PM CDT          multivitamin  :   multivitamin ; Status:   Documented ; Ordered As Mnemonic:   Multiple Vitamins oral tablet ; Simple Display Line:   Oral, daily, 0 Refill(s) ; Catalog Code:   multivitamin ; Order Dt/Tm:   9/19/2019 5:43:41 PM CDT          calcium-vitamin D  :   calcium-vitamin D ; Status:   Documented ; Ordered As Mnemonic:   Calcium 600+D ; Simple Display Line:   Oral, bid, 0 Refill(s) ; Catalog Code:   calcium-vitamin D ; Order Dt/Tm:   9/19/2019 5:44:04 PM CDT

## 2022-02-15 NOTE — PROCEDURES
Accession Number:       [id number]  CLINICAL INFORMATION::     None given  LMP::     NONE GIVEN  PREV. PAP::     2016  PREV. BX::     NONE GIVEN  SOURCE::     Cervix  STATEMENT OF ADEQUACY::     Satisfactory for evaluation. Endocervical/transformation zone component present. Age and/or menstrual status not provided  INTERPRETATION/RESULT::     Negative for intraepithelial lesion or malignancy.  COMMENT::     This Pap test has been evaluated with computer assisted technology.  CYTOTECHNOLOGIST::     JUDE COHEN(ASCP) CT Screening location: [place] [id number]  COMMENT:     See comment       EXPLANATORY NOTE:         The Pap is a screening test for cervical cancer. It is       not a diagnostic test and is subject to false negative       and false positive results. It is most reliable when a       satisfactory sample, regularly obtained, is submitted       with relevant clinical findings and history, and when       the Pap result is evaluated along with historic and       current clinical information.  HPV mRNA E6/E7:     Not Detected       Methodology: Transcription-Mediated Amplification       This assay detects E6/E7 viral messenger RNA (mRNA) from 14       high-risk HPV types (16,18,31,33,35,39,45,51,52,56,58,59,66,68).           The analytical performance characteristics of this       assay have been determined by [employer].       The modifications have not been cleared or approved       by the FDA. This assay has been validated pursuant       to the CLIA regulations and is used for       clinical purposes.         For additional information, please refer to       http://education.[employer].in2nite/faq/GPU732n3       (This link if provided for information/       educational purposes only.)

## 2022-03-02 VITALS
RESPIRATION RATE: 16 BRPM | TEMPERATURE: 97.8 F | SYSTOLIC BLOOD PRESSURE: 94 MMHG | DIASTOLIC BLOOD PRESSURE: 64 MMHG | WEIGHT: 159 LBS | HEART RATE: 64 BPM | BODY MASS INDEX: 26.49 KG/M2 | HEIGHT: 65 IN

## 2022-03-02 NOTE — NURSING NOTE
Pt preop Px faxed to [facility] f-[phone].  Confirmation received.  Denzel Sorto CMA

## 2022-03-02 NOTE — NURSING NOTE
Comprehensive Intake Entered On:  1/10/2022 9:15 AM CST    Performed On:  1/10/2022 9:09 AM CST by Denzel Sorto CMA               Summary   Chief Complaint :   Pt here for a Preop Px for an Upper Endoscopy at [facility] 1/20/22   Menstrual Status :   Menarcheal   Weight Measured :   159 lb(Converted to: 159 lb 0 oz, 72.121 kg)    Height Measured :   65 in(Converted to: 5 ft 5 in, 165.10 cm)    Body Mass Index :   26.46 kg/m2 (HI)    Body Surface Area :   1.82 m2   Systolic Blood Pressure :   94 mmHg   Diastolic Blood Pressure :   64 mmHg   Mean Arterial Pressure :   74 mmHg   Peripheral Pulse Rate :   64 bpm   BP Site :   Right arm   Pulse Site :   Radial artery   Temperature Tympanic :   97.8 DegF(Converted to: 36.6 DegC)  (LOW)    Respiratory Rate :   16 br/min   Languages :   English   Denzel Sorto CMA - 1/10/2022 9:09 AM CST   Health Status   Allergies Verified? :   Yes   Medication History Verified? :   Yes   Immunizations Current :   Yes   Medical History Verified? :   Yes   Pre-Visit Planning Status :   Completed   Tobacco Use? :   Never smoker   Denzel Sorto CMA - 1/10/2022 9:09 AM CST   Meds / Allergies   (As Of: 1/10/2022 9:15:18 AM CST)   Allergies (Active)   Keflex  Estimated Onset Date:   Unspecified ; Reactions:   pain in left kidney area ; Created By:   Eden Sánchez; Reaction Status:   Active ; Category:   Drug ; Substance:   Keflex ; Type:   Allergy ; Updated By:   Eden Sánchez; Source:   Paper Chart ; Reviewed Date:   1/10/2022 9:13 AM CST      Macrobid  Estimated Onset Date:   Unspecified ; Reactions:   Rash ; Created By:   Eden Sánchez; Reaction Status:   Active ; Category:   Drug ; Substance:   Macrobid ; Type:   Allergy ; Updated By:   Eden Sánchez; Source:   Paper Chart ; Reviewed Date:   1/10/2022 9:13 AM CST      metals  Estimated Onset Date:   Unspecified ; Created By:   Rose Moncada CMA; Reaction Status:   Active ; Category:   Other ; Substance:   metals ;  Type:   Allergy ; Updated By:   Rose Moncada CMA; Reviewed Date:   1/10/2022 9:13 AM CST      sulfa drug  Estimated Onset Date:   Unspecified ; Reactions:   Rash ; Created By:   Rubi Rosado CMA; Reaction Status:   Active ; Category:   Drug ; Substance:   sulfa drug ; Type:   Allergy ; Updated By:   Rubi Rosado CMA; Source:   Paper Chart ; Reviewed Date:   1/10/2022 9:13 AM CST        Medication List   (As Of: 1/10/2022 9:15:18 AM CST)   Home Meds    ubiquinone  :   ubiquinone ; Status:   Documented ; Ordered As Mnemonic:   Co Q-10 ; Simple Display Line:   100 mg, Oral, daily, 0 Refill(s) ; Catalog Code:   ubiquinone ; Order Dt/Tm:   7/8/2021 7:07:35 AM CDT          calcium-vitamin D  :   calcium-vitamin D ; Status:   Documented ; Ordered As Mnemonic:   Calcium 600+D ; Simple Display Line:   Oral, bid, 0 Refill(s) ; Catalog Code:   calcium-vitamin D ; Order Dt/Tm:   9/19/2019 5:44:04 PM CDT          multivitamin  :   multivitamin ; Status:   Documented ; Ordered As Mnemonic:   Multiple Vitamins oral tablet ; Simple Display Line:   Oral, daily, 0 Refill(s) ; Catalog Code:   multivitamin ; Order Dt/Tm:   9/19/2019 5:43:41 PM CDT          omega-3 polyunsaturated fatty acids  :   omega-3 polyunsaturated fatty acids ; Status:   Documented ; Ordered As Mnemonic:   Fish Oil ; Simple Display Line:   Oral, 0 Refill(s) ; Catalog Code:   omega-3 polyunsaturated fatty acids ; Order Dt/Tm:   3/18/2019 4:42:43 PM CDT            Social History   Social History   (As Of: 1/10/2022 9:15:18 AM CST)   Alcohol:        Current, 1-2 times per month, 2 drinks/episode average.   (Last Updated: 4/3/2012 10:17:40 AM CDT by Rose Moncada CMA)          Tobacco:        Never (less than 100 in lifetime)   (Last Updated: 1/25/2021 11:44:48 AM CST by Constance Miller CMA)          Electronic Cigarette/Vaping:        Electronic Cigarette Use: Never.   (Last Updated: 1/25/2021 11:44:51 AM CST by Fabiana Miller CMA           Substance Abuse:        Never   (Last Updated: 4/3/2012 10:18:04 AM CDT by Rose Moncada CMA)          Employment/School:        Employed, Work/School description: .   (Last Updated: 4/3/2012 10:18:20 AM CDT by Rose Moncada CMA)          Home/Environment:        Marital status: .  Spouse/Partner name: Kristopher Osborne.  1 children.   (Last Updated: 4/3/2012 10:18:51 AM CDT by Rose Moncada CMA)          Exercise:        Exercise frequency: 3-6 times per week.  Exercise type: Bicycling, Running, Walking, Weight lifting.   (Last Updated: 4/3/2012 10:19:26 AM CDT by Rose Moncada CMA)          Sexual:        Sexually active: Yes.  Sexual orientation: Heterosexual.  Other contraceptive use: OCP.   (Last Updated: 4/3/2012 10:23:21 AM CDT by Rose Moncada CMA)

## 2022-03-02 NOTE — PROGRESS NOTES
Patient:   ABIGAIL SWARTZ            MRN: [id number]            FIN: [id number]               Age:   56 years     Sex:  Female     :  1965   Associated Diagnoses:   Pre-operative examination; Pancreatic cyst   Author:   Donnell Houston PA-C      Preoperative Information   Procedure/ Case: upper endoscopy, pancreatic cyst   [facility] scheduled: [facility]   Date/ Time scheduled: 2022 8:00:00 AM      Chief Complaint   1/10/2022 9:09 AM CST    Pt here for a Preop Px for an Upper Endoscopy at [facility]      Review of Systems   Constitutional:  Negative.    Ear/Nose/Mouth/Throat:  Negative.    Respiratory:  Negative.    Cardiovascular:  Negative.    Gastrointestinal:  Negative.    Genitourinary:  Negative.    Musculoskeletal:  Negative.       Health Status   Allergies:    Allergic Reactions (Selected)  Severity Not Documented  Keflex (Pain in left kidney area)  Macrobid (Rash)  Metals (No reactions were documented)  Sulfa drug (Rash)   Medications:  (Selected)   Documented Medications  Documented  Calcium 600+D: Oral, bid, 0 Refill(s), Type: Maintenance  Co Q-10: ( 100 mg ), Oral, daily, 0 Refill(s), Type: Maintenance  Fish Oil: Oral, 0 Refill(s), Type: Maintenance  Multiple Vitamins oral tablet: Oral, daily, 0 Refill(s), Type: Maintenance   Problem list:    All Problems  Umbilical hernia / SNOMED CT 9100570156 / Confirmed  Family history of pancreatic cancer / SNOMED CT 0995690807 / Confirmed  Raynaud's disease / SNOMED CT 9466978883 / Confirmed  Mild Depression / ICD-9-.21 / Confirmed  Breast cancer / SNOMED CT 993053344 / Confirmed  Cyst of pancreas / SNOMED CT 52963438 / Confirmed  Microscopic Colitis / ICD-9-.9 / Confirmed  Acne / ICD-9-.1 / Confirmed  ASCUS on PAP Smear / ICD-9-.01 / Confirmed  Resolved: Mononucleosis / ICD-9-  Resolved: Tobacco abuse / SNOMED CT 608853972  Resolved: Pregnancy / SNOMED CT 386147831  Resolved: Ruptured Ovarian Cyst / ICD-9-.2       Histories   Past Medical History:    Active  ASCUS on PAP Smear (795.01): Onset in the month of 2008 at 42 years  Acne (706.1)  Microscopic Colitis (558.9)  Mild Depression (296.21)  Raynaud's disease (3283132219)  Breast cancer (511975750)  Resolved  Ruptured Ovarian Cyst (620.2): Onset in  at 22 years.  Resolved.  Tobacco abuse (136905856):  Resolved in  at 32 years.  Comments:  3/17/2010 CDT 4:15 PM CDT - Rubi Rosado CMA  Mononucleosis (075):  Resolved.  Comments:  3/17/2011 CDT 7:17 AM CDT - Zahra Jasmine  Hospitalization in high school and repeat bout in college.  Pregnancy (612069063):  Resolved on 2009 at 43 years.   Family History:    Cancer of bowel  Grandmother (M)  Diabetes mellitus  Grandfather (M)  Pancreatic cancer  Father  Mother ()  Aunt (P)  Breast Cancer  Sister  Aunt (P)     Procedure history:     section (53054587) in the month of 2009 at 43 Years.  Comments:  3/17/2011 7:24 AM CDT - Zahra Jasmine  G1, P1  Colonoscopy (898705433) on 2007 at 41 Years.  Tonsillectomy (110648517).  Extraction of wisdom tooth (226084926).  Breast surgery.   Social History:        Electronic Cigarette/Vaping Assessment            Electronic Cigarette Use: Never.      Alcohol Assessment            Current, 1-2 times per month, 2 drinks/episode average.      Tobacco Assessment            Never (less than 100 in lifetime)      Substance Abuse Assessment            Never      Employment and Education Assessment            Employed, Work/School description: .      Home and Environment Assessment            Marital status: .  Spouse/Partner name: Kristopher Osborne.  1 children.      Exercise and Physical Activity Assessment            Exercise frequency: 3-6 times per week.  Exercise type: Bicycling, Running, Walking, Weight lifting.      Sexual Assessment            Sexually active: Yes.  Sexual orientation: Heterosexual.  Other contraceptive use: OCP.        Has no history of anemia.  Has  no history of DVT or pulmonary embolism.  Has no personal history of bleeding problems.   Has  a personal history of anesthesia reactions. (nausea)  Has  no  personal history of sleep apnea  Patient does not have active tuberculosis.    S/he has not taken aspirin or aspirin-containing products in the last week.     S/he has not taken Plavix (Clopidogrel) in the last 2 weeks.    S/he  has not taken warfarin in the past week.    S/he  has not been on corticosteroids for more than 2 weeks recently.   S/he is not DNR before, during or after surgery.          Chest pain / SOB walking up 2 flights of steps? No  Pain in neck or jaw? No  CAD MI?  No  Afib? No  Heart Failure? No  Asthma  or Bronchitis? No  Diabetes? No       Insulin/Orals?   Seizure Disorder? No  CKD? No  Thyroid Disease? No  Liver Disease? No  CVA? No      Physical Examination   Vital Signs   1/10/2022 9:09 AM CST Temperature Tympanic 97.8 DegF  LOW    Peripheral Pulse Rate 64 bpm    Pulse Site Radial artery    Respiratory Rate 16 br/min    Systolic Blood Pressure 94 mmHg    Diastolic Blood Pressure 64 mmHg    Mean Arterial Pressure 74 mmHg    BP Site Right arm      Measurements from flowsheet : Measurements   1/10/2022 9:09 AM CST Height Measured - Standard 65 in    Weight Measured - Standard 159 lb    BSA 1.82 m2    Body Mass Index 26.46 kg/m2  HI      General:  No acute distress.    Eye:  Pupils are equal, round and reactive to light, Extraocular movements are intact.    HENT:  Tympanic membranes are clear, No pharyngeal erythema, No sinus tenderness.    Neck:  Supple, Non-tender, No lymphadenopathy.    Respiratory:  Lungs are clear to auscultation.    Cardiovascular:  Normal rate, Regular rhythm, No murmur.    Gastrointestinal:  Soft, Non-tender, Non-distended, Normal bowel sounds, No organomegaly.    Musculoskeletal:  Normal range of motion.    Neurologic:  Cranial Nerves II-XII are grossly intact, Normal deep tendon  reflexes.    Psychiatric:  Appropriate mood & affect.       Impression and Plan   Diagnosis     Pre-operative examination (NJY20-HX Z01.818).     Pancreatic cyst (QIA41-XB K86.2).     Orders     Orders   Charges (Evaluation and Management):  [id number] office o/p est mod 30-39 min (Charge) (Order): Quantity: 1, Pre-operative examination  Pancreatic cyst.     Approved for surgery without restrictions.     he will contact [facility] about getting testing for Covid-19 before surgery.

## 2022-07-17 ENCOUNTER — HEALTH MAINTENANCE LETTER (OUTPATIENT)
Age: 57
End: 2022-07-17

## 2022-09-25 ENCOUNTER — HEALTH MAINTENANCE LETTER (OUTPATIENT)
Age: 57
End: 2022-09-25

## 2023-03-24 ENCOUNTER — TELEPHONE (OUTPATIENT)
Dept: FAMILY MEDICINE | Facility: CLINIC | Age: 58
End: 2023-03-24
Payer: COMMERCIAL

## 2023-03-24 NOTE — TELEPHONE ENCOUNTER
General Call    Contacts       Type Contact Phone/Fax    03/24/2023 08:33 AM CDT Phone (Incoming) [facility] - [place], [place] - [address] (Pharmacy) [phone]     Ramos Ward        Reason for Call:  Ed Pharmacist from [facility] called to check if the PCP received their refill request for Epinephrine 0.3 injector.  Writer unable to confirm receipt. Medication was not on patient's medication list.  Ed will contact patient and confirm. Ed said he faxed a request yesterday to the clinic.    FYI at this time

## 2023-03-29 ENCOUNTER — OFFICE VISIT (OUTPATIENT)
Dept: FAMILY MEDICINE | Facility: CLINIC | Age: 58
End: 2023-03-29
Payer: COMMERCIAL

## 2023-03-29 VITALS
WEIGHT: 156 LBS | TEMPERATURE: 97.3 F | OXYGEN SATURATION: 100 % | HEART RATE: 60 BPM | SYSTOLIC BLOOD PRESSURE: 108 MMHG | BODY MASS INDEX: 25.99 KG/M2 | RESPIRATION RATE: 17 BRPM | DIASTOLIC BLOOD PRESSURE: 75 MMHG | HEIGHT: 65 IN

## 2023-03-29 DIAGNOSIS — Z85.3 HX: BREAST CANCER: ICD-10-CM

## 2023-03-29 DIAGNOSIS — K86.2 PANCREAS CYST: ICD-10-CM

## 2023-03-29 DIAGNOSIS — Z13.220 LIPID SCREENING: ICD-10-CM

## 2023-03-29 DIAGNOSIS — Z00.00 HEALTH CARE MAINTENANCE: ICD-10-CM

## 2023-03-29 DIAGNOSIS — R10.11 RUQ ABDOMINAL PAIN: ICD-10-CM

## 2023-03-29 DIAGNOSIS — Z13.1 SCREENING FOR DIABETES MELLITUS: ICD-10-CM

## 2023-03-29 DIAGNOSIS — Z23 NEED FOR VACCINATION: Primary | ICD-10-CM

## 2023-03-29 PROBLEM — K52.839 MICROSCOPIC COLITIS: Status: RESOLVED | Noted: 2017-04-20 | Resolved: 2023-03-29

## 2023-03-29 PROBLEM — R07.0 THROAT PAIN: Status: RESOLVED | Noted: 2017-10-12 | Resolved: 2023-03-29

## 2023-03-29 LAB
ALBUMIN SERPL BCG-MCNC: 4.6 G/DL (ref 3.5–5.2)
ALP SERPL-CCNC: 64 U/L (ref 35–104)
ALT SERPL W P-5'-P-CCNC: 15 U/L (ref 10–35)
AST SERPL W P-5'-P-CCNC: 29 U/L (ref 10–35)
BILIRUB DIRECT SERPL-MCNC: <0.2 MG/DL (ref 0–0.3)
BILIRUB SERPL-MCNC: 0.6 MG/DL
CHOLEST SERPL-MCNC: 253 MG/DL
ERYTHROCYTE [DISTWIDTH] IN BLOOD BY AUTOMATED COUNT: 13.6 % (ref 10–15)
FASTING STATUS PATIENT QL REPORTED: NORMAL
GLUCOSE SERPL-MCNC: 84 MG/DL (ref 70–99)
HCT VFR BLD AUTO: 43.1 % (ref 35–47)
HDLC SERPL-MCNC: 93 MG/DL
HGB BLD-MCNC: 14.1 G/DL (ref 11.7–15.7)
LDLC SERPL CALC-MCNC: 143 MG/DL
MCH RBC QN AUTO: 28.7 PG (ref 26.5–33)
MCHC RBC AUTO-ENTMCNC: 32.7 G/DL (ref 31.5–36.5)
MCV RBC AUTO: 88 FL (ref 78–100)
NONHDLC SERPL-MCNC: 160 MG/DL
PLATELET # BLD AUTO: 279 10E3/UL (ref 150–450)
PROT SERPL-MCNC: 7.4 G/DL (ref 6.4–8.3)
RBC # BLD AUTO: 4.92 10E6/UL (ref 3.8–5.2)
TRIGL SERPL-MCNC: 85 MG/DL
WBC # BLD AUTO: 6.3 10E3/UL (ref 4–11)

## 2023-03-29 PROCEDURE — 99213 OFFICE O/P EST LOW 20 MIN: CPT | Mod: 25 | Performed by: INTERNAL MEDICINE

## 2023-03-29 PROCEDURE — 85027 COMPLETE CBC AUTOMATED: CPT | Performed by: INTERNAL MEDICINE

## 2023-03-29 PROCEDURE — 80061 LIPID PANEL: CPT | Performed by: INTERNAL MEDICINE

## 2023-03-29 PROCEDURE — 99396 PREV VISIT EST AGE 40-64: CPT | Mod: 25 | Performed by: INTERNAL MEDICINE

## 2023-03-29 PROCEDURE — 36415 COLL VENOUS BLD VENIPUNCTURE: CPT | Performed by: INTERNAL MEDICINE

## 2023-03-29 PROCEDURE — 80076 HEPATIC FUNCTION PANEL: CPT | Performed by: INTERNAL MEDICINE

## 2023-03-29 PROCEDURE — 82947 ASSAY GLUCOSE BLOOD QUANT: CPT | Mod: QW | Performed by: INTERNAL MEDICINE

## 2023-03-29 PROCEDURE — 0134A COVID-19 VACCINE BIVALENT BOOSTER 18+ (MODERNA): CPT | Performed by: INTERNAL MEDICINE

## 2023-03-29 PROCEDURE — 91313 COVID-19 VACCINE BIVALENT BOOSTER 18+ (MODERNA): CPT | Performed by: INTERNAL MEDICINE

## 2023-03-29 RX ORDER — MULTIVITAMIN
1 TABLET ORAL DAILY
COMMUNITY

## 2023-03-29 RX ORDER — DIMENHYDRINATE 50 MG
100 TABLET ORAL
COMMUNITY
Start: 2021-07-08

## 2023-03-29 ASSESSMENT — ENCOUNTER SYMPTOMS
HEMATOCHEZIA: 0
HEARTBURN: 0
DIZZINESS: 0
COUGH: 0
MYALGIAS: 0
SHORTNESS OF BREATH: 0
NAUSEA: 0
DYSURIA: 0
ABDOMINAL PAIN: 1
ARTHRALGIAS: 0
BREAST MASS: 0
DIARRHEA: 0
WEAKNESS: 0
EYE PAIN: 0
CHILLS: 0
FEVER: 0
JOINT SWELLING: 1
HEMATURIA: 0
PALPITATIONS: 0
SORE THROAT: 0
FREQUENCY: 0
PARESTHESIAS: 0
NERVOUS/ANXIOUS: 0
CONSTIPATION: 0
HEADACHES: 0

## 2023-03-29 NOTE — ASSESSMENT & PLAN NOTE
CRC: normal colonoscopy in 2018 -recs for 10-year follow-up  Cervical Ca: Normal Pap testing with HPV in 2021, [place] for 5-year follow-up  Completed COVID vaccination series  Recommending zoster vaccination prior to age 60

## 2023-03-29 NOTE — PROGRESS NOTES
SUBJECTIVE:   CC: Jasmin is an 57 year old who presents for preventive health visit.  Presents for preventative exam.  Also brings up complaints of intermittent right upper quadrant pains.  Had had abdominal imaging pursued in 2021 demonstrating small caliber pancreatic cystic changes.  Did have an endoscopic ultrasound by Dr. Mathur which was unremarkable.  Did recommend 2-year follow-up endoscopic ultrasound given cystic changes.  Since that time describes more of a right upper quadrant intermittent discomfort.  Has not noticed any real associations with eating.  Seems to be better when standing at work; aggravated by seated position for long durations of time especially in the car.  No fevers or chills.  No stool changes.  No descriptions of cholelithiasis on previous imaging.  Remote history of breast cancer, followed by double mastectomy.  Describes some occasional swelling in left arm does not use any lymphatic sleeve.  Additional Questions 3/29/2023   Roomed by Kavya RAMIREZ   Patient has been advised of split billing requirements and indicates understanding: Yes  Healthy Habits:     Getting at least 3 servings of Calcium per day:  Yes    Bi-annual eye exam:  NO    Dental care twice a year:  Yes    Sleep apnea or symptoms of sleep apnea:  None    Diet:  Regular (no restrictions)    Frequency of exercise:  2-3 days/week    Duration of exercise:  15-30 minutes    Taking medications regularly:  Yes    Medication side effects:  Not applicable    PHQ-2 Total Score: 1    Additional concerns today:  Yes      Today's PHQ-2 Score:   PHQ-2 ( 1999 Pfizer) 3/29/2023   Q1: Little interest or pleasure in doing things 0   Q2: Feeling down, depressed or hopeless 1   PHQ-2 Score 1   Q1: Little interest or pleasure in doing things Not at all   Q2: Feeling down, depressed or hopeless Several days   PHQ-2 Score 1       Have you ever done Advance Care Planning? (For example, a Health Directive, POLST, or a discussion with a  medical provider or your loved ones about your wishes): No, advance care planning information given to patient to review.  Patient plans to discuss their wishes with loved ones or provider.      Social History     Tobacco Use     Smoking status: Never     Smokeless tobacco: Never   Substance Use Topics     Alcohol use: Yes     Alcohol/week: 0.0 - 2.0 standard drinks         Alcohol Use 3/29/2023   Prescreen: >3 drinks/day or >7 drinks/week? No       Breast Cancer Screening:    FHS-7:   Breast CA Risk Assessment (FHS-7) 3/29/2023   Did any of your first-degree relatives have breast or ovarian cancer? No   Did any of your relatives have bilateral breast cancer? Yes   Did any man in your family have breast cancer? No   Did any woman in your family have breast and ovarian cancer? Yes   Did any woman in your family have breast cancer before age 50 y? Yes   Do you have 2 or more relatives with breast and/or ovarian cancer? Yes   Do you have 2 or more relatives with breast and/or bowel cancer? Yes       Mammogram Screening - Mammography discussed, not appropriate due to double mastectomy  Pertinent mammograms are reviewed under the imaging tab.    History of abnormal Pap smear: NO - age 30-65 PAP every 5 years with negative HPV co-testing recommended     Reviewed and updated as needed this visit by clinical staff   Tobacco  Allergies  Meds              Reviewed and updated as needed this visit by Provider                   Review of Systems   Constitutional: Negative for chills and fever.   HENT: Negative for congestion, ear pain, hearing loss and sore throat.    Eyes: Negative for pain and visual disturbance.   Respiratory: Negative for cough and shortness of breath.    Cardiovascular: Positive for peripheral edema. Negative for chest pain and palpitations.   Gastrointestinal: Positive for abdominal pain. Negative for constipation, diarrhea, heartburn, hematochezia and nausea.   Breasts:  Negative for breast mass and  "discharge.   Genitourinary: Negative for dysuria, frequency, genital sores, hematuria, pelvic pain, urgency, vaginal bleeding and vaginal discharge.   Musculoskeletal: Positive for joint swelling. Negative for arthralgias and myalgias.   Skin: Negative for rash.   Neurological: Negative for dizziness, weakness, headaches and paresthesias.   Psychiatric/Behavioral: Negative for mood changes. The patient is not nervous/anxious.         OBJECTIVE:   /75 (BP Location: Right arm, Patient Position: Sitting, Cuff Size: Adult Regular)   Pulse 60   Temp 97.3  F (36.3  C) (Tympanic)   Resp 17   Ht 1.638 m (5' 4.5\")   Wt 70.8 kg (156 lb)   SpO2 100%   BMI 26.36 kg/m    Physical Exam  GENERAL: healthy, alert and no distress  NECK: no adenopathy, no asymmetry, masses, or scars and thyroid normal to palpation  RESP: lungs clear to auscultation - no rales, rhonchi or wheezes  CV: regular rate and rhythm, normal S1 S2, no S3 or S4, no murmur, click or rub, no peripheral edema and peripheral pulses strong  ABDOMEN: soft, no hepatosplenomegaly, no masses and bowel sounds anastasiya.  Focal pain to palpation along epigastric and right upper quadrant function with positive Shi sign  MS: no gross musculoskeletal defects noted, no edema    Diagnostic Test Results:  Labs reviewed in Epic    ASSESSMENT/PLAN:     Problem List Items Addressed This Visit        Nervous and Auditory    RUQ abdominal pain     Prior CT imaging in 2021 without any mention of cholelithiasis  -We will arrange for right upper quadrant ultrasound given focal tenderness on exam  -Check LFTs, CBC  -Assuming normal studies, advised patient to monitor symptoms for now -counseled on signs and symptoms of traditional biliary colic especially with postprandial component         Relevant Orders    Hepatic panel (Albumin, ALT, AST, Bili, Alk Phos, TP)    CBC with platelets (Completed)    US Abdomen Limited       Digestive    Pancreas cyst     6/2021: small caliber " "~6mm cysts seen in pancreas   -1/2022: Subsequent EUS by Dr. Mathur completed; anatomically normal without biopsies taken   -Recommendations for 2-year follow-up endoscopic ultrasound            Other    HX: breast cancer     Remote history of double mastectomy and silicone breast implants  -Completed 5 years of tamoxifen therapy  -Not currently following with any medical oncology provider at this time         Health care maintenance     CRC: normal colonoscopy in 2018 -recs for 10-year follow-up  Cervical Ca: Normal Pap testing with HPV in 2021, Caden for 5-year follow-up  Completed COVID vaccination series  Recommending zoster vaccination prior to age 60         Relevant Orders    REVIEW OF HEALTH MAINTENANCE PROTOCOL ORDERS (Completed)   Other Visit Diagnoses     Need for vaccination    -  Primary    Relevant Orders    COVID-19 VACCINE BIVALENT BOOSTER 18+ (MODERNA) (Completed)    Lipid screening        Relevant Orders    Lipid panel reflex to direct LDL Non-fasting    Screening for diabetes mellitus        Relevant Orders    Glucose                COUNSELING:  Reviewed preventive health counseling, as reflected in patient instructions      BMI:   Estimated body mass index is 26.36 kg/m  as calculated from the following:    Height as of this encounter: 1.638 m (5' 4.5\").    Weight as of this encounter: 70.8 kg (156 lb).   Weight management plan: Discussed healthy diet and exercise guidelines      She reports that she has never smoked. She has never used smokeless tobacco.          Khurram Armendariz MD  [facility]    Prior to immunization administration, verified patients identity using patient s name and date of birth. Please see Immunization Activity for additional information.     Screening Questionnaire for Adult Immunization    Are you sick today?   No   Do you have allergies to medications, food, a vaccine component or latex?   No   Have you ever had a serious reaction after " receiving a vaccination?   No   Do you have a long-term health problem with heart, lung, kidney, or metabolic disease (e.g., diabetes), asthma, a blood disorder, no spleen, complement component deficiency, a cochlear implant, or a spinal fluid leak?  Are you on long-term aspirin therapy?   No   Do you have cancer, leukemia, HIV/AIDS, or any other immune system problem?   No   Do you have a parent, brother, or sister with an immune system problem?   No   In the past 3 months, have you taken medications that affect  your immune system, such as prednisone, other steroids, or anticancer drugs; drugs for the treatment of rheumatoid arthritis, Crohn s disease, or psoriasis; or have you had radiation treatments?   No   Have you had a seizure, or a brain or other nervous system problem?   No   During the past year, have you received a transfusion of blood or blood    products, or been given immune (gamma) globulin or antiviral drug?   No   For women: Are you pregnant or is there a chance you could become       pregnant during the next month?   No   Have you received any vaccinations in the past 4 weeks?   No     Immunization questionnaire answers were all negative.      Injection of COVID booster given by Kavya Matias MA. Patient instructed to remain in clinic for 15 minutes afterwards, and to report any adverse reactions.     Screening performed by Kavya Matias MA on 3/29/2023 at 8:10 AM.

## 2023-03-29 NOTE — ASSESSMENT & PLAN NOTE
Prior CT imaging in 2021 without any mention of cholelithiasis  -We will arrange for right upper quadrant ultrasound given focal tenderness on exam  -Check LFTs, CBC  -Assuming normal studies, advised patient to monitor symptoms for now -counseled on signs and symptoms of traditional biliary colic especially with postprandial component

## 2023-03-29 NOTE — ASSESSMENT & PLAN NOTE
Remote history of double mastectomy and silicone breast implants  -Completed 5 years of tamoxifen therapy  -Not currently following with any medical oncology provider at this time

## 2023-03-29 NOTE — ASSESSMENT & PLAN NOTE
6/2021: small caliber ~6mm cysts seen in pancreas   -1/2022: Subsequent EUS by Dr. Mathur completed; anatomically normal without biopsies taken   -Recommendations for 2-year follow-up endoscopic ultrasound

## 2024-01-17 ENCOUNTER — TELEPHONE (OUTPATIENT)
Dept: FAMILY MEDICINE | Facility: CLINIC | Age: 59
End: 2024-01-17
Payer: COMMERCIAL

## 2024-01-17 NOTE — TELEPHONE ENCOUNTER
SHAMEKA contacted pt.  Per pt, she had a provider from [facility]--home service come to her to do a preop.  Pt was given phone number for Lou at [place] as well as the fax number for her to fax preop to.

## 2024-01-17 NOTE — TELEPHONE ENCOUNTER
01/17/24  General Call      Reason for Call:  Request for pre op physical    What are your questions or concerns:  Lou @ [facility] endoscopy requesting pre op physical or H & P be sent over. Her appt for an endoscopic US is tomorrow 01/18/24 @ 1015 with an 0815 arrival. Please fax to: [phone]    Date of last appointment with provider: 03/29/23    Lou [facility]: [phone]

## 2024-02-28 ENCOUNTER — PATIENT OUTREACH (OUTPATIENT)
Dept: CARE COORDINATION | Facility: CLINIC | Age: 59
End: 2024-02-28
Payer: COMMERCIAL

## 2024-05-11 ENCOUNTER — HEALTH MAINTENANCE LETTER (OUTPATIENT)
Age: 59
End: 2024-05-11

## 2024-07-23 SDOH — HEALTH STABILITY: PHYSICAL HEALTH: ON AVERAGE, HOW MANY DAYS PER WEEK DO YOU ENGAGE IN MODERATE TO STRENUOUS EXERCISE (LIKE A BRISK WALK)?: 3 DAYS

## 2024-07-23 SDOH — HEALTH STABILITY: PHYSICAL HEALTH: ON AVERAGE, HOW MANY MINUTES DO YOU ENGAGE IN EXERCISE AT THIS LEVEL?: 30 MIN

## 2024-07-23 ASSESSMENT — SOCIAL DETERMINANTS OF HEALTH (SDOH): HOW OFTEN DO YOU GET TOGETHER WITH FRIENDS OR RELATIVES?: ONCE A WEEK

## 2024-07-24 ENCOUNTER — OFFICE VISIT (OUTPATIENT)
Dept: FAMILY MEDICINE | Facility: CLINIC | Age: 59
End: 2024-07-24
Payer: COMMERCIAL

## 2024-07-24 VITALS
SYSTOLIC BLOOD PRESSURE: 113 MMHG | OXYGEN SATURATION: 98 % | BODY MASS INDEX: 24.66 KG/M2 | TEMPERATURE: 98 F | HEART RATE: 80 BPM | DIASTOLIC BLOOD PRESSURE: 75 MMHG | RESPIRATION RATE: 14 BRPM | WEIGHT: 148 LBS | HEIGHT: 65 IN

## 2024-07-24 DIAGNOSIS — K86.2 PANCREAS CYST: ICD-10-CM

## 2024-07-24 DIAGNOSIS — Z13.1 SCREENING FOR DIABETES MELLITUS: ICD-10-CM

## 2024-07-24 DIAGNOSIS — Z85.3 HX: BREAST CANCER: ICD-10-CM

## 2024-07-24 DIAGNOSIS — E78.5 DYSLIPIDEMIA: ICD-10-CM

## 2024-07-24 DIAGNOSIS — K75.81 STEATOHEPATITIS: ICD-10-CM

## 2024-07-24 DIAGNOSIS — K75.81 NASH (NONALCOHOLIC STEATOHEPATITIS): ICD-10-CM

## 2024-07-24 DIAGNOSIS — C50.919 MALIGNANT NEOPLASM OF FEMALE BREAST, UNSPECIFIED ESTROGEN RECEPTOR STATUS, UNSPECIFIED LATERALITY, UNSPECIFIED SITE OF BREAST (H): Primary | ICD-10-CM

## 2024-07-24 DIAGNOSIS — Z00.00 HEALTH CARE MAINTENANCE: ICD-10-CM

## 2024-07-24 PROCEDURE — 99396 PREV VISIT EST AGE 40-64: CPT | Performed by: INTERNAL MEDICINE

## 2024-07-24 RX ORDER — BUPROPION HYDROCHLORIDE 300 MG/1
300 TABLET ORAL EVERY MORNING
COMMUNITY
Start: 2024-04-08

## 2024-07-24 NOTE — ASSESSMENT & PLAN NOTE
'23: RUQ with mild fatty liver changes  - normal AST, ALT  -Low risk for future progression to LILLY  -Discussed roles on monitoring transaminases, consideration for future repeat liver ultrasound to assess fatty deposits

## 2024-07-24 NOTE — PROGRESS NOTES
"Preventive Care Visit  [facility]  Khurram Armendariz MD, Internal Medicine  Jul 24, 2024      Assessment & Plan   Problem List Items Addressed This Visit          Digestive    Pancreas cyst     6/2021: small caliber ~6mm cysts seen in pancreas   -1/2024: Subsequent EUS by Dr. Mathur completed; anatomically normal without biopsies taken   -Recommendations for 2-year follow-up MRCP         Steatohepatitis     '23: RUQ with mild fatty liver changes  - normal AST, ALT  -Low risk for future progression to LILLY  -Discussed roles on monitoring transaminases, consideration for future repeat liver ultrasound to assess fatty deposits            Other    HX: breast cancer     Remote history of double mastectomy and silicone breast implants  -Completed 5 years of tamoxifen therapy  -Not currently following with any medical oncology provider at this time         Health care maintenance     CRC: normal colonoscopy in 2018 -recs for 10-year follow-up  Cervical Ca: Normal Pap testing with HPV in 2021, Caden for 5-year follow-up  Recommending zoster vaccination prior to age 60  Prior life insurance - genetic cancer screening - negative          RESOLVED: Malignant neoplasm of female breast, unspecified estrogen receptor status, unspecified laterality, unspecified site of breast (H) - Primary     Other Visit Diagnoses       Dyslipidemia        Relevant Orders    Lipid panel reflex to direct LDL Fasting    LILLY (nonalcoholic steatohepatitis)        Relevant Orders    Hepatic panel (Albumin, ALT, AST, Bili, Alk Phos, TP)    Screening for diabetes mellitus        Relevant Orders    Glucose                  BMI  Estimated body mass index is 25.01 kg/m  as calculated from the following:    Height as of this encounter: 1.638 m (5' 4.5\").    Weight as of this encounter: 67.1 kg (148 lb).       Counseling  Appropriate preventive services were addressed with this patient via screening, questionnaire, or discussion as " appropriate for fall prevention, nutrition, physical activity, Tobacco-use cessation, weight loss and cognition.  Checklist reviewing preventive services available has been given to the patient.  Reviewed patient's diet, addressing concerns and/or questions.   She is at risk for lack of exercise and has been provided with information to increase physical activity for the benefit of her well-being.   She is at risk for psychosocial distress and has been provided with information to reduce risk.     FUTURE APPOINTMENTS:       - Follow-up visit in 12 months      Lindsay Castellanos is a 58 year old, presenting for the following: Returns for annual physical.  Still has some intermittent right upper quadrant discomfort, mainly postprandially -fairly mild in features typically will persist for several days.  Did have endoscopic ultrasound in January for small caliber pancreatic cyst -recommended for MRCP in 2 years.  Did get started on Wellbutrin earlier this year; feels like her mood is noticeably improved though she feels like it was more stressors from earlier in the year -she expresses interest in potentially weaning off over time.  Now postmenopausal amenorrheic; did have some questions on timing of bone density testing  Physical        7/24/2024    10:16 AM   Additional Questions   Roomed by Kavya CONNELL        Health Care Directive  Patient does not have a Health Care Directive or Living Will: Advance Directive received and scanned. Click on Code in the patient header to view.    HPI        7/23/2024   General Health   How would you rate your overall physical health? Good   Feel stress (tense, anxious, or unable to sleep) Only a little      (!) STRESS CONCERN      7/23/2024   Nutrition   Three or more servings of calcium each day? Yes   Diet: Regular (no restrictions)   How many servings of fruit and vegetables per day? (!) 2-3   How many sweetened beverages each day? 0-1            7/23/2024   Exercise   Days per  week of moderate/strenous exercise 3 days   Average minutes spent exercising at this level 30 min            7/23/2024   Social Factors   Frequency of gathering with friends or relatives Once a week   Worry food won't last until get money to buy more No   Food not last or not have enough money for food? No   Do you have housing? (Housing is defined as stable permanent housing and does not include staying ouside in a car, in a tent, in an abandoned building, in an overnight shelter, or couch-surfing.) Yes   Are you worried about losing your housing? No   Lack of transportation? No   Unable to get utilities (heat,electricity)? No            7/23/2024   Fall Risk   Fallen 2 or more times in the past year? No   Trouble with walking or balance? No             7/23/2024   Dental   Dentist two times every year? Yes            7/23/2024   TB Screening   Were you born outside of the [place]? No            Today's PHQ-2 Score:       7/23/2024     7:42 AM   PHQ-2 ( 1999 Pfizer)   Q1: Little interest or pleasure in doing things 0   Q2: Feeling down, depressed or hopeless 1   PHQ-2 Score 1   Q1: Little interest or pleasure in doing things Not at all   Q2: Feeling down, depressed or hopeless Several days   PHQ-2 Score 1           7/23/2024   Substance Use   Alcohol more than 3/day or more than 7/wk No   Do you use any other substances recreationally? No        Social History     Tobacco Use    Smoking status: Never     Passive exposure: Never    Smokeless tobacco: Never   Vaping Use    Vaping status: Never Used   Substance Use Topics    Alcohol use: Yes     Alcohol/week: 0.0 - 2.0 standard drinks of alcohol    Drug use: No           3/29/2023   LAST FHS-7 RESULTS   1st degree relative breast or ovarian cancer No   Any relative bilateral breast cancer Yes   Any male have breast cancer No   Any ONE woman have BOTH breast AND ovarian cancer Yes   Any woman with breast cancer before 50yrs Yes   2 or more relatives with breast AND/OR  "ovarian cancer Yes   2 or more relatives with breast AND/OR bowel cancer Yes                   7/23/2024   STI Screening   New sexual partner(s) since last STI/HIV test? No        History of abnormal Pap smear: No - age 30- 64 PAP with HPV every 5 years recommended       ASCVD Risk   The 10-year ASCVD risk score (Villa GREWAL, et al., 2019) is: 1.4%    Values used to calculate the score:      Age: 58 years      Sex: Female      Is Non- : No      Diabetic: No      Tobacco smoker: No      Systolic Blood Pressure: 113 mmHg      Is BP treated: No      HDL Cholesterol: 93 mg/dL      Total Cholesterol: 253 mg/dL           Reviewed and updated as needed this visit by Provider                        Review of Systems  Constitutional, HEENT, cardiovascular, pulmonary, gi and gu systems are negative, except as otherwise noted.     Objective    Exam  /75   Pulse 80   Temp 98  F (36.7  C)   Resp 14   Ht 1.638 m (5' 4.5\")   Wt 67.1 kg (148 lb)   LMP  (LMP Unknown)   SpO2 98%   BMI 25.01 kg/m     Estimated body mass index is 25.01 kg/m  as calculated from the following:    Height as of this encounter: 1.638 m (5' 4.5\").    Weight as of this encounter: 67.1 kg (148 lb).    Physical Exam  GENERAL: alert and no distress  NECK: no adenopathy, no asymmetry, masses, or scars  RESP: lungs clear to auscultation - no rales, rhonchi or wheezes  CV: regular rate and rhythm, normal S1 S2, no S3 or S4, no murmur, click or rub, no peripheral edema  ABDOMEN: soft, nontender, no hepatosplenomegaly, no masses and bowel sounds normal  MS: no gross musculoskeletal defects noted, no edema        Signed Electronically by: Khurram Armendariz MD    "

## 2024-07-24 NOTE — ASSESSMENT & PLAN NOTE
6/2021: small caliber ~6mm cysts seen in pancreas   -1/2024: Subsequent EUS by Dr. Mathur completed; anatomically normal without biopsies taken   -Recommendations for 2-year follow-up MRCP

## 2024-07-24 NOTE — ASSESSMENT & PLAN NOTE
CRC: normal colonoscopy in 2018 -recs for 10-year follow-up  Cervical Ca: Normal Pap testing with HPV in 2021, Caden for 5-year follow-up  Recommending zoster vaccination prior to age 60  Prior life insurance - genetic cancer screening - negative

## 2024-07-25 ENCOUNTER — LAB (OUTPATIENT)
Dept: LAB | Facility: CLINIC | Age: 59
End: 2024-07-25
Payer: COMMERCIAL

## 2024-07-25 DIAGNOSIS — Z13.1 SCREENING FOR DIABETES MELLITUS: ICD-10-CM

## 2024-07-25 DIAGNOSIS — E78.5 DYSLIPIDEMIA: ICD-10-CM

## 2024-07-25 DIAGNOSIS — K75.81 NASH (NONALCOHOLIC STEATOHEPATITIS): ICD-10-CM

## 2024-07-25 LAB
ALBUMIN SERPL BCG-MCNC: 4.4 G/DL (ref 3.5–5.2)
ALP SERPL-CCNC: 71 U/L (ref 40–150)
ALT SERPL W P-5'-P-CCNC: 23 U/L (ref 0–50)
AST SERPL W P-5'-P-CCNC: 30 U/L (ref 0–45)
BILIRUB DIRECT SERPL-MCNC: <0.2 MG/DL (ref 0–0.3)
BILIRUB SERPL-MCNC: 0.4 MG/DL
CHOLEST SERPL-MCNC: 210 MG/DL
FASTING STATUS PATIENT QL REPORTED: ABNORMAL
FASTING STATUS PATIENT QL REPORTED: NORMAL
GLUCOSE SERPL-MCNC: 84 MG/DL (ref 70–99)
HDLC SERPL-MCNC: 100 MG/DL
LDLC SERPL CALC-MCNC: 98 MG/DL
NONHDLC SERPL-MCNC: 110 MG/DL
PROT SERPL-MCNC: 7 G/DL (ref 6.4–8.3)
TRIGL SERPL-MCNC: 59 MG/DL

## 2024-07-25 PROCEDURE — 36415 COLL VENOUS BLD VENIPUNCTURE: CPT

## 2024-07-25 PROCEDURE — 80076 HEPATIC FUNCTION PANEL: CPT

## 2024-07-25 PROCEDURE — 80061 LIPID PANEL: CPT

## 2024-07-25 PROCEDURE — 82947 ASSAY GLUCOSE BLOOD QUANT: CPT | Mod: QW

## 2024-09-13 ENCOUNTER — TELEPHONE (OUTPATIENT)
Dept: FAMILY MEDICINE | Facility: CLINIC | Age: 59
End: 2024-09-13

## 2024-09-13 ENCOUNTER — OFFICE VISIT (OUTPATIENT)
Dept: FAMILY MEDICINE | Facility: CLINIC | Age: 59
End: 2024-09-13
Payer: COMMERCIAL

## 2024-09-13 VITALS
SYSTOLIC BLOOD PRESSURE: 110 MMHG | OXYGEN SATURATION: 99 % | DIASTOLIC BLOOD PRESSURE: 70 MMHG | TEMPERATURE: 98.2 F | HEART RATE: 68 BPM

## 2024-09-13 DIAGNOSIS — M79.672 FOOT PAIN, LEFT: ICD-10-CM

## 2024-09-13 DIAGNOSIS — R20.0 NUMBNESS OF TOES: Primary | ICD-10-CM

## 2024-09-13 PROCEDURE — 99213 OFFICE O/P EST LOW 20 MIN: CPT | Performed by: NURSE PRACTITIONER

## 2024-09-13 ASSESSMENT — ENCOUNTER SYMPTOMS: NUMBNESS: 1

## 2024-09-13 NOTE — PROGRESS NOTES
"  Assessment & Plan     Numbness of toes  New onset numbness of the left fourth toe with an uncomfortable feeling in the forefoot, most consistent with Saab's neuroma.  Recommend wearing shoes with a wider toebox and avoiding heels.  Referral placed to podiatry for consideration of special orthotics and possibly injection, prefers [facility].  She is provided information on Saab's neuroma today.  - Orthopedic  Referral; Future    Foot pain, left  See above  - Orthopedic  Referral; Future          BMI  Estimated body mass index is 25.01 kg/m  as calculated from the following:    Height as of 7/24/24: 1.638 m (5' 4.5\").    Weight as of 7/24/24: 67.1 kg (148 lb).             Lindsay Castellanos is a 58 year old, presenting for the following health issues:  Numbness (Numbness on bottom of Lt foot x 1 week)        9/13/2024     8:15 AM   Additional Questions   Roomed by ELIANA Luis     Jasmin is a very pleasant 58-year-old female with history of Raynaud's disease and history of breast cancer who presents today for symptoms in her left foot.  Reports symptoms started last week with her left fourth toe started to feel numb.  She describes it felt like she was \"stepping on a rock\".  She feels like there is a ball or a washcloth on the bottom of her left forefoot.  It feels swollen but it does not look swollen.  Feels better when she wear shoes.  She denies any significant pain but she has some discomfort or uncomfortable sensation.  No history of foot surgery.  She is pretty active walking, doing elliptical or biking.  She does some weightlifting.  She does not wear high-heeled shoes but does wear a slight low heel, more often in the summer.  States her calves feel better when she is wearing a low heel versus flat shoes.      History of Present Illness       Reason for visit:  Foot numbness  Symptom onset:  3-7 days ago   She is taking medications regularly.                 Review of " Systems  Constitutional, HEENT, cardiovascular, pulmonary, MSK, SKIN systems are negative, except as otherwise noted.      Objective    /70 (BP Location: Right arm, Patient Position: Sitting, Cuff Size: Adult Regular)   Pulse 68   Temp 98.2  F (36.8  C) (Tympanic)   LMP  (LMP Unknown)   SpO2 99%   There is no height or weight on file to calculate BMI.  Physical Exam  Constitutional:       Appearance: Normal appearance.   HENT:      Head: Normocephalic and atraumatic.   Musculoskeletal:      Right foot: Normal.      Left foot: Normal range of motion and normal capillary refill. Tenderness present. No swelling, deformity, bunion or bony tenderness. Normal pulse.      Comments: Reproduction of symptoms with very mild pain with compression between the third and fourth metatarsal heads.   Neurological:      Mental Status: She is alert and oriented to person, place, and time.   Psychiatric:         Mood and Affect: Mood normal.         Behavior: Behavior normal.                    Signed Electronically by: BRODERICK Cervantes CNP

## 2024-09-16 ENCOUNTER — PATIENT OUTREACH (OUTPATIENT)
Dept: CARE COORDINATION | Facility: CLINIC | Age: 59
End: 2024-09-16
Payer: COMMERCIAL

## 2024-09-18 ENCOUNTER — PATIENT OUTREACH (OUTPATIENT)
Dept: CARE COORDINATION | Facility: CLINIC | Age: 59
End: 2024-09-18
Payer: COMMERCIAL

## 2024-12-26 ENCOUNTER — TELEPHONE (OUTPATIENT)
Dept: FAMILY MEDICINE | Facility: CLINIC | Age: 59
End: 2024-12-26
Payer: COMMERCIAL

## 2024-12-26 NOTE — TELEPHONE ENCOUNTER
S-(situation): the patient had right heal pain.      B-(background):the patient has orthotics for valentin neuroma.      A-(assessment): the patient has right heal pain.  This has been going on for one month.  It is affecting her walking.  She does not want to walk as much.    R-(recommendations): the patient was advised to be seen and evaluated. The patient was transferred to [facility] to assist with clinic near her. The patient was advised she may go to [facility] if needed.    Thank you    Xochitl RAMIREZ RN

## 2024-12-27 ENCOUNTER — OFFICE VISIT (OUTPATIENT)
Dept: FAMILY MEDICINE | Facility: CLINIC | Age: 59
End: 2024-12-27
Payer: COMMERCIAL

## 2024-12-27 ENCOUNTER — ANCILLARY PROCEDURE (OUTPATIENT)
Dept: GENERAL RADIOLOGY | Facility: CLINIC | Age: 59
End: 2024-12-27
Payer: COMMERCIAL

## 2024-12-27 VITALS
TEMPERATURE: 97.9 F | DIASTOLIC BLOOD PRESSURE: 66 MMHG | HEART RATE: 62 BPM | SYSTOLIC BLOOD PRESSURE: 102 MMHG | BODY MASS INDEX: 25.01 KG/M2 | HEIGHT: 65 IN | OXYGEN SATURATION: 98 % | RESPIRATION RATE: 16 BRPM

## 2024-12-27 DIAGNOSIS — M79.672 PAIN OF LEFT HEEL: ICD-10-CM

## 2024-12-27 DIAGNOSIS — M79.672 PAIN OF LEFT HEEL: Primary | ICD-10-CM

## 2024-12-27 PROCEDURE — 99213 OFFICE O/P EST LOW 20 MIN: CPT

## 2024-12-27 PROCEDURE — 73650 X-RAY EXAM OF HEEL: CPT | Mod: TC | Performed by: RADIOLOGY

## 2024-12-27 RX ORDER — AMOXICILLIN 500 MG
1200 CAPSULE ORAL DAILY
COMMUNITY

## 2024-12-27 RX ORDER — PREDNISONE 20 MG/1
TABLET ORAL
Qty: 20 TABLET | Refills: 0 | Status: SHIPPED | OUTPATIENT
Start: 2024-12-27

## 2024-12-27 NOTE — PROGRESS NOTES
"  Assessment & Plan     Pain of left heel  Pain of left heel that has been worsening.  Has been ongoing for 2 months.  Pain with palpation, no pain with flex his shin or extension of foot.  Will do x-ray today to assess for fracture or bone spur potentially causing this.  Discussed stretches for tendinopathy and will treat with prednisone.  Patient to follow-up with podiatrist if not improving.  - predniSONE (DELTASONE) 20 MG tablet; Take 3 tabs by mouth daily x 3 days, then 2 tabs daily x 3 days, then 1 tab daily x 3 days, then 1/2 tab daily x 3 days.  - XR Calcaneus Left G/E 2 Views; Future    BMI  Estimated body mass index is 25.01 kg/m  as calculated from the following:    Height as of this encounter: 1.638 m (5' 4.5\").    Weight as of 7/24/24: 67.1 kg (148 lb).             Lindsay Castellanos is a 59 year old, presenting for the following health issues:  Foot Pain (Left heel pain x2 months. )      12/27/2024     3:50 PM   Additional Questions   Roomed by jesus alberto bazan   Accompanied by n/a         12/27/2024   Declines Weight   Did patient decline having their weight taken? Yes     Left heel pain for 2 months. Pain is constant, worsened by pressure. Pain worsens after sitting and then standing, worse in am. Was having mortons neuroma and was given insoles, but heel pain started before insole use. Insoles do seem to help. Has tried walking less and rest.    History of Present Illness       Reason for visit:  Heel pain  Symptom onset:  More than a month  Symptom intensity:  Severe  Symptom progression:  Worsening   She is taking medications regularly.                     Objective    /66 (BP Location: Right arm, Patient Position: Sitting)   Pulse 62   Temp 97.9  F (36.6  C) (Tympanic)   Resp 16   Ht 1.638 m (5' 4.5\")   LMP  (LMP Unknown)   SpO2 98%   BMI 25.01 kg/m    Body mass index is 25.01 kg/m .  Physical Exam               Signed Electronically by: BRODERICK Abdul CNP    "

## 2025-02-17 ENCOUNTER — E-VISIT (OUTPATIENT)
Dept: URGENT CARE | Facility: CLINIC | Age: 60
End: 2025-02-17
Payer: COMMERCIAL

## 2025-02-17 DIAGNOSIS — N39.0 ACUTE UTI (URINARY TRACT INFECTION): Primary | ICD-10-CM

## 2025-02-17 RX ORDER — CIPROFLOXACIN 250 MG/1
250 TABLET, FILM COATED ORAL 2 TIMES DAILY
Qty: 6 TABLET | Refills: 0 | Status: SHIPPED | OUTPATIENT
Start: 2025-02-17 | End: 2025-02-20

## 2025-02-17 NOTE — PATIENT INSTRUCTIONS
Dear Jasmin Osborne    After reviewing your responses, I've been able to diagnose you with a urinary tract infection, which is a common infection of the bladder with bacteria.  This is not a sexually transmitted infection, though urinating immediately after intercourse can help prevent infections.  Drinking lots of fluids is also helpful to clear your current infection and prevent the next one.      I have sent a prescription for antibiotics to your pharmacy to treat this infection.    It is important that you take all of your prescribed medication even if your symptoms are improving after a few doses.  Taking all of your medicine helps prevent the symptoms from returning.     If your symptoms worsen, you develop pain in your back or stomach, develop fevers, or are not improving in 5 days, please contact your primary care provider for an appointment or visit any of our convenient Walk-in or Urgent Care Centers to be seen, which can be found on our website here.    Thanks again for choosing us as your health care partner,    BRODERICK Anthony CNP  Urinary Tract Infection (UTI) in Women: Care Instructions  Overview     A urinary tract infection (UTI) is an infection caused by bacteria. It can happen anywhere in the urinary tract. A UTI can happen in the:  Kidneys.  Ureters, the tubes that connect the kidneys to the bladder.  Bladder.  Urethra, where the urine comes out.  Most UTIs are bladder infections. They often cause pain or burning when you urinate.  Most UTIs can be cured with antibiotics. If you are prescribed antibiotics, be sure to complete your treatment so that the infection does not get worse.  Follow-up care is a key part of your treatment and safety. Be sure to make and go to all appointments, and call your doctor if you are having problems. It's also a good idea to know your test results and keep a list of the medicines you take.  How can you care for yourself at home?  Take your antibiotics  "as directed. Do not stop taking them just because you feel better. You need to take the full course of antibiotics.  Drink extra water and other fluids for the next day or two. This will help make the urine less concentrated and help wash out the bacteria that are causing the infection. (If you have kidney, heart, or liver disease and have to limit fluids, talk with your doctor before you increase the amount of fluids you drink.)  Avoid drinks that are carbonated or have caffeine. They can irritate the bladder.  Urinate often. Try to empty your bladder each time.  To relieve pain, take a hot bath or lay a heating pad set on low over your lower belly or genital area. Never go to sleep with a heating pad in place.  To prevent UTIs  Drink plenty of water each day. This helps you urinate often, which clears bacteria from your system. (If you have kidney, heart, or liver disease and have to limit fluids, talk with your doctor before you increase the amount of fluids you drink.)  Urinate when you need to.  If you are sexually active, urinate right after you have sex.  Change sanitary pads often.  Avoid douches, bubble baths, feminine hygiene sprays, and other feminine hygiene products that have deodorants.  After going to the bathroom, wipe from front to back.  When should you call for help?   Call your doctor now or seek immediate medical care if:    You have new or worse fever, chills, nausea, or vomiting.     You have new pain in your back just below your rib cage. This is called flank pain.     There is new blood or pus in your urine.     You have any problems with your antibiotic medicine.   Watch closely for changes in your health, and be sure to contact your doctor if:    You are not getting better after taking an antibiotic for 2 days.     Your symptoms go away but then come back.   Where can you learn more?  Go to https://www.healthwise.net/patiented  Enter K848 in the search box to learn more about \"Urinary " "Tract Infection (UTI) in Women: Care Instructions.\"  Current as of: April 30, 2024  Content Version: 14.3    2024 [employer].   Care instructions adapted under license by your healthcare professional. If you have questions about a medical condition or this instruction, always ask your healthcare professional. [employer] disclaims any warranty or liability for your use of this information.    "

## 2025-06-16 ENCOUNTER — PATIENT OUTREACH (OUTPATIENT)
Dept: CARE COORDINATION | Facility: CLINIC | Age: 60
End: 2025-06-16
Payer: COMMERCIAL

## 2025-06-30 ENCOUNTER — PATIENT OUTREACH (OUTPATIENT)
Dept: CARE COORDINATION | Facility: CLINIC | Age: 60
End: 2025-06-30
Payer: COMMERCIAL

## 2025-07-05 ENCOUNTER — RESULTS FOLLOW-UP (OUTPATIENT)
Dept: URGENT CARE | Facility: URGENT CARE | Age: 60
End: 2025-07-05

## 2025-07-05 ENCOUNTER — OFFICE VISIT (OUTPATIENT)
Dept: URGENT CARE | Facility: URGENT CARE | Age: 60
End: 2025-07-05
Payer: COMMERCIAL

## 2025-07-05 VITALS
OXYGEN SATURATION: 99 % | WEIGHT: 160 LBS | HEIGHT: 64 IN | HEART RATE: 72 BPM | TEMPERATURE: 98.3 F | RESPIRATION RATE: 16 BRPM | BODY MASS INDEX: 27.31 KG/M2 | SYSTOLIC BLOOD PRESSURE: 112 MMHG | DIASTOLIC BLOOD PRESSURE: 76 MMHG

## 2025-07-05 DIAGNOSIS — R42 LIGHTHEADEDNESS: ICD-10-CM

## 2025-07-05 DIAGNOSIS — H11.32 SUBCONJUNCTIVAL HEMORRHAGE OF LEFT EYE: Primary | ICD-10-CM

## 2025-07-05 LAB
ANION GAP SERPL CALCULATED.3IONS-SCNC: 11 MMOL/L (ref 3–14)
BASOPHILS # BLD AUTO: 0 10E3/UL (ref 0–0.2)
BASOPHILS NFR BLD AUTO: 0 %
BUN SERPL-MCNC: 12 MG/DL (ref 7–30)
CALCIUM SERPL-MCNC: 9.7 MG/DL (ref 8.5–10.1)
CHLORIDE BLD-SCNC: 107 MMOL/L (ref 94–109)
CO2 SERPL-SCNC: 26 MMOL/L (ref 20–32)
CREAT SERPL-MCNC: 0.6 MG/DL (ref 0.52–1.04)
EGFRCR SERPLBLD CKD-EPI 2021: >90 ML/MIN/1.73M2
EOSINOPHIL # BLD AUTO: 0.3 10E3/UL (ref 0–0.7)
EOSINOPHIL NFR BLD AUTO: 4 %
ERYTHROCYTE [DISTWIDTH] IN BLOOD BY AUTOMATED COUNT: 14 % (ref 10–15)
GLUCOSE BLD-MCNC: 89 MG/DL (ref 70–99)
HCT VFR BLD AUTO: 40.4 % (ref 35–47)
HGB BLD-MCNC: 13.6 G/DL (ref 11.7–15.7)
IMM GRANULOCYTES # BLD: 0 10E3/UL
IMM GRANULOCYTES NFR BLD: 0 %
LYMPHOCYTES # BLD AUTO: 2.4 10E3/UL (ref 0.8–5.3)
LYMPHOCYTES NFR BLD AUTO: 36 %
MCH RBC QN AUTO: 29.2 PG (ref 26.5–33)
MCHC RBC AUTO-ENTMCNC: 33.7 G/DL (ref 31.5–36.5)
MCV RBC AUTO: 87 FL (ref 78–100)
MONOCYTES # BLD AUTO: 0.7 10E3/UL (ref 0–1.3)
MONOCYTES NFR BLD AUTO: 10 %
NEUTROPHILS # BLD AUTO: 3.4 10E3/UL (ref 1.6–8.3)
NEUTROPHILS NFR BLD AUTO: 51 %
PLATELET # BLD AUTO: 279 10E3/UL (ref 150–450)
POTASSIUM BLD-SCNC: 4.5 MMOL/L (ref 3.4–5.3)
RBC # BLD AUTO: 4.66 10E6/UL (ref 3.8–5.2)
SODIUM SERPL-SCNC: 144 MMOL/L (ref 135–145)
WBC # BLD AUTO: 6.8 10E3/UL (ref 4–11)

## 2025-07-05 PROCEDURE — 80048 BASIC METABOLIC PNL TOTAL CA: CPT

## 2025-07-05 PROCEDURE — 99214 OFFICE O/P EST MOD 30 MIN: CPT

## 2025-07-05 PROCEDURE — 3078F DIAST BP <80 MM HG: CPT

## 2025-07-05 PROCEDURE — 36415 COLL VENOUS BLD VENIPUNCTURE: CPT

## 2025-07-05 PROCEDURE — 3074F SYST BP LT 130 MM HG: CPT

## 2025-07-05 PROCEDURE — 85025 COMPLETE CBC W/AUTO DIFF WBC: CPT

## 2025-07-05 NOTE — PROGRESS NOTES
"Assessment & Plan     Subconjunctival hemorrhage of left eye  Small area medially, unclear if came on prior to or after the episode of lightheadedness but noted after. Recommend observation, will resolve without intervention will check CBC for recently having 2 episodes of subconjunctival hemorrhage and uncplained bruising previously on the back of the legs.  Labs reassuring, normal CBC, BMP.   - CBC with platelets and differential  - Basic metabolic panel  (Ca, Cl, CO2, Creat, Gluc, K, Na, BUN)  - CBC with platelets and differential  - Basic metabolic panel  (Ca, Cl, CO2, Creat, Gluc, K, Na, BUN)    Lightheadedness  Short lived episode of \"dizziness\" discribed as lightheaded this morning lasting about 2 minutes and has not recurred. No associated palpitations, chest pain, sob, syncope.  Etiology unclear. Currently vitally normal, normal neurologic exam.  Did not feel like her previous episodes of vertigo.  She was in the heat outside yesterday, felt like she had been hydrating and occurred prior to breakfast so could have been due to mild dehydration or lower BS.  Difficult to say for certain. Will check CBC and BMP for metabolic and hematologic causes.  Does not sound like typical ACS and no chest pain, palpitations, or persistent dizziness, sob, or nausea. If this is a recurrent problem consider cardiac monitor.   Will have pt follow-up with her pcp for any persistent or worsening sx.         Labs returned normal for CBC/BMP      - CBC with platelets and differential  - Basic metabolic panel  (Ca, Cl, CO2, Creat, Gluc, K, Na, BUN)  - CBC with platelets and differential  - Basic metabolic panel  (Ca, Cl, CO2, Creat, Gluc, K, Na, BUN)           [facility] [place]    Lindsay Castellanos is a 59 year old female who presents to clinic today for the following health issues:  Chief Complaint   Patient presents with    Dizziness     States was just sitting this morning and " "had dizziness that lasted 1-2 minutes   but now has red spot on left eye states has had dizziness in past several weeks also       HPI  Pt is a 59 year old female, presents to urgent care with concern re: dizziness and also a red spot noted in the L eye.  Was seen 2 weeks ago for vasomotor sx of menopause,   Effexor has been very helpful and has noted improvement of sleep, hot flashes, urinary urgency.    This morning however had a 2 minute episode where she had sudden onset of \"dizziness\"      Felt lightheaded/dizzy suddenly and passed quickly 1-2 min and then noted.  Sensation of lightheadedness started in the upper chest and went into the head.    Not room spinning.  No palpations.    No nausea/vomiting.no fevers.   No headache   No sob, difficulty breathing or chest pain.   Does feel well hydrated.  Was out in the head a long time yesterday, did a lot of sweating but does feel she hydrated well.     Episode happed before she had breakfast.    After episode looked in the mirror and saw a red spot in the L eye. No trauma. No vision changes.  Had a similar red spot laterally same eye the week prior and also noted unexplained bruising of the posterior thighs the week prior after sitting on a chair.        Review of Systems  Constitutional, HEENT, cardiovascular, pulmonary, gi and gu systems are negative, except as otherwise noted.      Patient Active Problem List   Diagnosis    HX: breast cancer    Chronic fatigue syndrome    Insomnia    Raynaud's disease    BPPV (benign paroxysmal positional vertigo)    Palpitations    Health care maintenance    RUQ abdominal pain    Pancreas cyst    Steatohepatitis       Objective    /76   Pulse 72   Temp 98.3  F (36.8  C) (Oral)   Resp 16   Ht 1.626 m (5' 4\")   Wt 72.6 kg (160 lb)   LMP  (LMP Unknown)   SpO2 99%   BMI 27.46 kg/m    Physical Exam   Pt is in no acute distress and appears well  Conjunctiva injected on the L medial eye, approximately 5-6 mm in size. "   Ears patent B:  TM s intact, non-injected. All land marks easily visibile    Nasal mucosa is non-edematous, no discharge.    Pharynx: non erythematous, tonsils non hypertrophied, No exudate   Neck supple: no adenopathy  Lungs: CTA  Heart: RRR, no murmur, no thrills or heaves   Ext: no edema  Skin: no rashes    One eccymosis, small on the L lower leg.  Muscular strength, sensation and DTR are symetrical and WNL for upper and lower ext B.    CN 2-12 intact.   PERRL/EOMI        Results for orders placed or performed in visit on 07/05/25   Basic metabolic panel  (Ca, Cl, CO2, Creat, Gluc, K, Na, BUN)     Status: Normal   Result Value Ref Range    Sodium 144 135 - 145 mmol/L    Potassium 4.5 3.4 - 5.3 mmol/L    Chloride 107 94 - 109 mmol/L    Carbon Dioxide (CO2) 26 20 - 32 mmol/L    Anion Gap 11 3 - 14 mmol/L    Urea Nitrogen 12 7 - 30 mg/dL    Creatinine 0.60 0.52 - 1.04 mg/dL    GFR Estimate >90 >60 mL/min/1.73m2    Calcium 9.7 8.5 - 10.1 mg/dL    Glucose 89 70 - 99 mg/dL   CBC with platelets and differential     Status: None   Result Value Ref Range    WBC Count 6.8 4.0 - 11.0 10e3/uL    RBC Count 4.66 3.80 - 5.20 10e6/uL    Hemoglobin 13.6 11.7 - 15.7 g/dL    Hematocrit 40.4 35.0 - 47.0 %    MCV 87 78 - 100 fL    MCH 29.2 26.5 - 33.0 pg    MCHC 33.7 31.5 - 36.5 g/dL    RDW 14.0 10.0 - 15.0 %    Platelet Count 279 150 - 450 10e3/uL    % Neutrophils 51 %    % Lymphocytes 36 %    % Monocytes 10 %    % Eosinophils 4 %    % Basophils 0 %    % Immature Granulocytes 0 %    Absolute Neutrophils 3.4 1.6 - 8.3 10e3/uL    Absolute Lymphocytes 2.4 0.8 - 5.3 10e3/uL    Absolute Monocytes 0.7 0.0 - 1.3 10e3/uL    Absolute Eosinophils 0.3 0.0 - 0.7 10e3/uL    Absolute Basophils 0.0 0.0 - 0.2 10e3/uL    Absolute Immature Granulocytes 0.0 <=0.4 10e3/uL   CBC with platelets and differential     Status: None    Narrative    The following orders were created for panel order CBC with platelets and differential.  Procedure                                Abnormality         Status                     ---------                               -----------         ------                     CBC with platelets and ...[[id number]]                      Final result                 Please view results for these tests on the individual orders.

## 2025-07-05 NOTE — PROGRESS NOTES
Urgent Care Clinic Visit    Chief Complaint   Patient presents with    Dizziness     States was just sitting this morning and had dizziness that lasted 1-2 minutes   but now has red spot on left eye states has had dizziness in past several weeks also

## 2025-07-14 ENCOUNTER — PATIENT OUTREACH (OUTPATIENT)
Dept: CARE COORDINATION | Facility: CLINIC | Age: 60
End: 2025-07-14
Payer: COMMERCIAL

## 2025-08-08 ENCOUNTER — OFFICE VISIT (OUTPATIENT)
Dept: FAMILY MEDICINE | Facility: CLINIC | Age: 60
End: 2025-08-08
Attending: NURSE PRACTITIONER
Payer: COMMERCIAL

## 2025-08-08 VITALS
HEIGHT: 64 IN | SYSTOLIC BLOOD PRESSURE: 109 MMHG | DIASTOLIC BLOOD PRESSURE: 74 MMHG | BODY MASS INDEX: 27.49 KG/M2 | RESPIRATION RATE: 16 BRPM | OXYGEN SATURATION: 99 % | WEIGHT: 161 LBS | TEMPERATURE: 98.6 F

## 2025-08-08 DIAGNOSIS — N95.1 VASOMOTOR SYMPTOMS DUE TO MENOPAUSE: ICD-10-CM

## 2025-08-08 DIAGNOSIS — K86.2 PANCREAS CYST: ICD-10-CM

## 2025-08-08 DIAGNOSIS — Z23 NEED FOR VACCINATION: Primary | ICD-10-CM

## 2025-08-08 DIAGNOSIS — Z00.00 HEALTH CARE MAINTENANCE: ICD-10-CM

## 2025-08-08 DIAGNOSIS — M13.0 POLYARTHRITIS: ICD-10-CM

## 2025-08-08 DIAGNOSIS — R63.5 WEIGHT GAIN: ICD-10-CM

## 2025-08-08 DIAGNOSIS — Z85.3 HX: BREAST CANCER: ICD-10-CM

## 2025-08-08 PROBLEM — R10.11 RUQ ABDOMINAL PAIN: Status: RESOLVED | Noted: 2023-03-29 | Resolved: 2025-08-08

## 2025-08-08 LAB — ERYTHROCYTE [SEDIMENTATION RATE] IN BLOOD BY WESTERGREN METHOD: 6 MM/HR (ref 0–30)

## 2025-08-08 PROCEDURE — 3074F SYST BP LT 130 MM HG: CPT | Performed by: INTERNAL MEDICINE

## 2025-08-08 PROCEDURE — 36415 COLL VENOUS BLD VENIPUNCTURE: CPT | Performed by: INTERNAL MEDICINE

## 2025-08-08 PROCEDURE — 85652 RBC SED RATE AUTOMATED: CPT | Performed by: INTERNAL MEDICINE

## 2025-08-08 PROCEDURE — 90715 TDAP VACCINE 7 YRS/> IM: CPT | Performed by: INTERNAL MEDICINE

## 2025-08-08 PROCEDURE — 90471 IMMUNIZATION ADMIN: CPT | Performed by: INTERNAL MEDICINE

## 2025-08-08 PROCEDURE — 99214 OFFICE O/P EST MOD 30 MIN: CPT | Mod: 25 | Performed by: INTERNAL MEDICINE

## 2025-08-08 PROCEDURE — 86431 RHEUMATOID FACTOR QUANT: CPT | Performed by: INTERNAL MEDICINE

## 2025-08-08 PROCEDURE — 84443 ASSAY THYROID STIM HORMONE: CPT | Performed by: INTERNAL MEDICINE

## 2025-08-08 PROCEDURE — 86038 ANTINUCLEAR ANTIBODIES: CPT | Performed by: INTERNAL MEDICINE

## 2025-08-08 PROCEDURE — 3078F DIAST BP <80 MM HG: CPT | Performed by: INTERNAL MEDICINE

## 2025-08-08 PROCEDURE — 86140 C-REACTIVE PROTEIN: CPT | Performed by: INTERNAL MEDICINE

## 2025-08-08 PROCEDURE — 86200 CCP ANTIBODY: CPT | Performed by: INTERNAL MEDICINE

## 2025-08-08 RX ORDER — VENLAFAXINE HYDROCHLORIDE 75 MG/1
75 CAPSULE, EXTENDED RELEASE ORAL DAILY
Qty: 90 CAPSULE | Refills: 3 | Status: SHIPPED | OUTPATIENT
Start: 2025-08-08

## 2025-08-09 LAB
CRP SERPL-MCNC: <3 MG/L
RHEUMATOID FACT SERPL-ACNC: <10 IU/ML
TSH SERPL DL<=0.005 MIU/L-ACNC: 1.44 UIU/ML (ref 0.3–4.2)

## 2025-08-11 LAB — CCP AB SER IA-ACNC: 1 U/ML

## 2025-08-12 LAB
ANA PAT SER IF-IMP: ABNORMAL
ANA SER QL IF: POSITIVE
ANA TITR SER IF: ABNORMAL {TITER}

## 2025-08-30 ENCOUNTER — HEALTH MAINTENANCE LETTER (OUTPATIENT)
Age: 60
End: 2025-08-30